# Patient Record
Sex: FEMALE | Race: WHITE | NOT HISPANIC OR LATINO | Employment: FULL TIME | URBAN - METROPOLITAN AREA
[De-identification: names, ages, dates, MRNs, and addresses within clinical notes are randomized per-mention and may not be internally consistent; named-entity substitution may affect disease eponyms.]

---

## 2017-01-13 ENCOUNTER — GENERIC CONVERSION - ENCOUNTER (OUTPATIENT)
Dept: OTHER | Facility: OTHER | Age: 32
End: 2017-01-13

## 2017-01-20 ENCOUNTER — HOSPITAL ENCOUNTER (OUTPATIENT)
Dept: ULTRASOUND IMAGING | Facility: HOSPITAL | Age: 32
Discharge: HOME | End: 2017-01-20
Payer: COMMERCIAL

## 2017-01-20 ENCOUNTER — ALLSCRIPTS OFFICE VISIT (OUTPATIENT)
Dept: OTHER | Facility: OTHER | Age: 32
End: 2017-01-20

## 2017-01-20 DIAGNOSIS — O20.0 THREATENED ABORTION: ICD-10-CM

## 2017-01-20 DIAGNOSIS — Z33.1 PREGNANT STATE, INCIDENTAL: ICD-10-CM

## 2017-01-20 DIAGNOSIS — O03.9 COMPLETE OR UNSPECIFIED SPONTANEOUS ABORTION WITHOUT COMPLICATION: ICD-10-CM

## 2017-01-20 PROCEDURE — 76801 OB US < 14 WKS SINGLE FETUS: CPT

## 2017-01-26 ENCOUNTER — GENERIC CONVERSION - ENCOUNTER (OUTPATIENT)
Dept: OTHER | Facility: OTHER | Age: 32
End: 2017-01-26

## 2017-01-27 ENCOUNTER — ALLSCRIPTS OFFICE VISIT (OUTPATIENT)
Dept: OTHER | Facility: OTHER | Age: 32
End: 2017-01-27

## 2017-02-03 ENCOUNTER — ALLSCRIPTS OFFICE VISIT (OUTPATIENT)
Dept: OTHER | Facility: OTHER | Age: 32
End: 2017-02-03

## 2017-02-06 ENCOUNTER — HOSPITAL ENCOUNTER (OUTPATIENT)
Dept: RADIOLOGY | Age: 32
Discharge: HOME | End: 2017-02-06
Payer: COMMERCIAL

## 2017-02-06 ENCOUNTER — TRANSCRIBE ORDERS (OUTPATIENT)
Dept: ADMINISTRATIVE | Facility: HOSPITAL | Age: 32
End: 2017-02-06

## 2017-02-06 DIAGNOSIS — O02.0 BLIGHTED OVUM: Primary | ICD-10-CM

## 2017-02-06 DIAGNOSIS — O02.0 BLIGHTED OVUM: ICD-10-CM

## 2017-02-06 PROCEDURE — 76801 OB US < 14 WKS SINGLE FETUS: CPT

## 2017-02-07 ENCOUNTER — GENERIC CONVERSION - ENCOUNTER (OUTPATIENT)
Dept: OTHER | Facility: OTHER | Age: 32
End: 2017-02-07

## 2017-02-08 ENCOUNTER — ALLSCRIPTS OFFICE VISIT (OUTPATIENT)
Dept: OTHER | Facility: OTHER | Age: 32
End: 2017-02-08

## 2017-02-09 ENCOUNTER — GENERIC CONVERSION - ENCOUNTER (OUTPATIENT)
Dept: OTHER | Facility: OTHER | Age: 32
End: 2017-02-09

## 2017-02-10 ENCOUNTER — ALLSCRIPTS OFFICE VISIT (OUTPATIENT)
Dept: OTHER | Facility: OTHER | Age: 32
End: 2017-02-10

## 2017-02-10 ENCOUNTER — GENERIC CONVERSION - ENCOUNTER (OUTPATIENT)
Dept: OTHER | Facility: OTHER | Age: 32
End: 2017-02-10

## 2017-02-13 ENCOUNTER — GENERIC CONVERSION - ENCOUNTER (OUTPATIENT)
Dept: OTHER | Facility: OTHER | Age: 32
End: 2017-02-13

## 2017-02-16 ENCOUNTER — ALLSCRIPTS OFFICE VISIT (OUTPATIENT)
Dept: OTHER | Facility: OTHER | Age: 32
End: 2017-02-16

## 2017-02-24 ENCOUNTER — GENERIC CONVERSION - ENCOUNTER (OUTPATIENT)
Dept: OTHER | Facility: OTHER | Age: 32
End: 2017-02-24

## 2017-03-08 ENCOUNTER — GENERIC CONVERSION - ENCOUNTER (OUTPATIENT)
Dept: OTHER | Facility: OTHER | Age: 32
End: 2017-03-08

## 2017-03-22 ENCOUNTER — GENERIC CONVERSION - ENCOUNTER (OUTPATIENT)
Dept: OTHER | Facility: OTHER | Age: 32
End: 2017-03-22

## 2017-03-31 ENCOUNTER — GENERIC CONVERSION - ENCOUNTER (OUTPATIENT)
Dept: OTHER | Facility: OTHER | Age: 32
End: 2017-03-31

## 2017-07-07 ENCOUNTER — ALLSCRIPTS OFFICE VISIT (OUTPATIENT)
Dept: OTHER | Facility: OTHER | Age: 32
End: 2017-07-07

## 2017-07-21 ENCOUNTER — ALLSCRIPTS OFFICE VISIT (OUTPATIENT)
Dept: OTHER | Facility: OTHER | Age: 32
End: 2017-07-21

## 2017-07-21 ENCOUNTER — GENERIC CONVERSION - ENCOUNTER (OUTPATIENT)
Dept: OTHER | Facility: OTHER | Age: 32
End: 2017-07-21

## 2017-07-27 ENCOUNTER — GENERIC CONVERSION - ENCOUNTER (OUTPATIENT)
Dept: OTHER | Facility: OTHER | Age: 32
End: 2017-07-27

## 2017-08-04 ENCOUNTER — TRANSCRIBE ORDERS (OUTPATIENT)
Dept: LAB | Facility: CLINIC | Age: 32
End: 2017-08-04

## 2017-08-04 ENCOUNTER — APPOINTMENT (OUTPATIENT)
Dept: LAB | Facility: CLINIC | Age: 32
End: 2017-08-04
Payer: COMMERCIAL

## 2017-08-04 ENCOUNTER — GENERIC CONVERSION - ENCOUNTER (OUTPATIENT)
Dept: OTHER | Facility: OTHER | Age: 32
End: 2017-08-04

## 2017-08-04 ENCOUNTER — ALLSCRIPTS OFFICE VISIT (OUTPATIENT)
Dept: OTHER | Facility: OTHER | Age: 32
End: 2017-08-04

## 2017-08-04 DIAGNOSIS — Z34.90 ENCOUNTER FOR SUPERVISION OF NORMAL PREGNANCY: ICD-10-CM

## 2017-08-04 DIAGNOSIS — Z34.90 PRENATAL CARE, UNSPECIFIED TRIMESTER: ICD-10-CM

## 2017-08-04 DIAGNOSIS — Z34.90 PRENATAL CARE, UNSPECIFIED TRIMESTER: Primary | ICD-10-CM

## 2017-08-04 LAB
ABO GROUP BLD: NORMAL
BASOPHILS # BLD AUTO: 0.01 THOUSANDS/ΜL (ref 0–0.1)
BASOPHILS NFR BLD AUTO: 0 % (ref 0–1)
BILIRUB UR QL STRIP: NEGATIVE
BLD GP AB SCN SERPL QL: NEGATIVE
CLARITY UR: CLEAR
COLOR UR: YELLOW
EOSINOPHIL # BLD AUTO: 0.12 THOUSAND/ΜL (ref 0–0.61)
EOSINOPHIL NFR BLD AUTO: 2 % (ref 0–6)
ERYTHROCYTE [DISTWIDTH] IN BLOOD BY AUTOMATED COUNT: 14.3 % (ref 11.6–15.1)
GLUCOSE UR STRIP-MCNC: NEGATIVE MG/DL
HCT VFR BLD AUTO: 37.5 % (ref 34.8–46.1)
HGB BLD-MCNC: 12.7 G/DL (ref 11.5–15.4)
HGB UR QL STRIP.AUTO: NEGATIVE
KETONES UR STRIP-MCNC: NEGATIVE MG/DL
LEUKOCYTE ESTERASE UR QL STRIP: NEGATIVE
LYMPHOCYTES # BLD AUTO: 2.86 THOUSANDS/ΜL (ref 0.6–4.47)
LYMPHOCYTES NFR BLD AUTO: 38 % (ref 14–44)
MCH RBC QN AUTO: 28.3 PG (ref 26.8–34.3)
MCHC RBC AUTO-ENTMCNC: 33.9 G/DL (ref 31.4–37.4)
MCV RBC AUTO: 84 FL (ref 82–98)
MONOCYTES # BLD AUTO: 0.5 THOUSAND/ΜL (ref 0.17–1.22)
MONOCYTES NFR BLD AUTO: 7 % (ref 4–12)
NEUTROPHILS # BLD AUTO: 3.95 THOUSANDS/ΜL (ref 1.85–7.62)
NEUTS SEG NFR BLD AUTO: 53 % (ref 43–75)
NITRITE UR QL STRIP: NEGATIVE
PH UR STRIP.AUTO: 5.5 [PH] (ref 4.5–8)
PLATELET # BLD AUTO: 277 THOUSANDS/UL (ref 149–390)
PMV BLD AUTO: 10.4 FL (ref 8.9–12.7)
PROT UR STRIP-MCNC: NEGATIVE MG/DL
RBC # BLD AUTO: 4.48 MILLION/UL (ref 3.81–5.12)
RH BLD: POSITIVE
RUBV IGG SERPL IA-ACNC: 107.4 IU/ML
SP GR UR STRIP.AUTO: 1.02 (ref 1–1.03)
SPECIMEN EXPIRATION DATE: NORMAL
UROBILINOGEN UR QL STRIP.AUTO: 0.2 E.U./DL
WBC # BLD AUTO: 7.44 THOUSAND/UL (ref 4.31–10.16)

## 2017-08-04 PROCEDURE — 80081 OBSTETRIC PANEL INC HIV TSTG: CPT

## 2017-08-04 PROCEDURE — 36415 COLL VENOUS BLD VENIPUNCTURE: CPT

## 2017-08-04 PROCEDURE — 87086 URINE CULTURE/COLONY COUNT: CPT

## 2017-08-04 PROCEDURE — 81003 URINALYSIS AUTO W/O SCOPE: CPT | Performed by: OBSTETRICS & GYNECOLOGY

## 2017-08-05 LAB
BACTERIA UR CULT: NORMAL
HBV SURFACE AG SER QL: NORMAL
HIV 1+2 AB+HIV1 P24 AG SERPL QL IA: NORMAL
RPR SER QL: NORMAL

## 2017-08-08 ENCOUNTER — GENERIC CONVERSION - ENCOUNTER (OUTPATIENT)
Dept: OTHER | Facility: OTHER | Age: 32
End: 2017-08-08

## 2017-08-08 ENCOUNTER — LAB REQUISITION (OUTPATIENT)
Dept: LAB | Facility: HOSPITAL | Age: 32
End: 2017-08-08
Payer: COMMERCIAL

## 2017-08-08 DIAGNOSIS — Z34.90 ENCOUNTER FOR SUPERVISION OF NORMAL PREGNANCY: ICD-10-CM

## 2017-08-08 PROCEDURE — 87491 CHLMYD TRACH DNA AMP PROBE: CPT | Performed by: OBSTETRICS & GYNECOLOGY

## 2017-08-08 PROCEDURE — 87591 N.GONORRHOEAE DNA AMP PROB: CPT | Performed by: OBSTETRICS & GYNECOLOGY

## 2017-08-09 LAB
CHLAMYDIA DNA CVX QL NAA+PROBE: NORMAL
N GONORRHOEA DNA GENITAL QL NAA+PROBE: NORMAL

## 2017-09-12 ENCOUNTER — GENERIC CONVERSION - ENCOUNTER (OUTPATIENT)
Dept: OTHER | Facility: OTHER | Age: 32
End: 2017-09-12

## 2017-09-12 ENCOUNTER — ALLSCRIPTS OFFICE VISIT (OUTPATIENT)
Dept: OTHER | Facility: OTHER | Age: 32
End: 2017-09-12

## 2017-10-04 ENCOUNTER — GENERIC CONVERSION - ENCOUNTER (OUTPATIENT)
Dept: OTHER | Facility: OTHER | Age: 32
End: 2017-10-04

## 2017-10-04 ENCOUNTER — ALLSCRIPTS OFFICE VISIT (OUTPATIENT)
Dept: PERINATAL CARE | Facility: CLINIC | Age: 32
End: 2017-10-04
Payer: COMMERCIAL

## 2017-10-04 PROCEDURE — 76817 TRANSVAGINAL US OBSTETRIC: CPT | Performed by: OBSTETRICS & GYNECOLOGY

## 2017-10-04 PROCEDURE — 76811 OB US DETAILED SNGL FETUS: CPT | Performed by: OBSTETRICS & GYNECOLOGY

## 2017-10-13 ENCOUNTER — GENERIC CONVERSION - ENCOUNTER (OUTPATIENT)
Dept: OTHER | Facility: OTHER | Age: 32
End: 2017-10-13

## 2017-11-13 ENCOUNTER — GENERIC CONVERSION - ENCOUNTER (OUTPATIENT)
Dept: OTHER | Facility: OTHER | Age: 32
End: 2017-11-13

## 2017-11-20 ENCOUNTER — APPOINTMENT (OUTPATIENT)
Dept: LAB | Facility: CLINIC | Age: 32
End: 2017-11-20
Payer: COMMERCIAL

## 2017-11-20 ENCOUNTER — TRANSCRIBE ORDERS (OUTPATIENT)
Dept: LAB | Facility: CLINIC | Age: 32
End: 2017-11-20

## 2017-11-20 DIAGNOSIS — Z3A.26 26 WEEKS GESTATION OF PREGNANCY: ICD-10-CM

## 2017-11-20 DIAGNOSIS — Z3A.26 26 WEEKS GESTATION OF PREGNANCY: Primary | ICD-10-CM

## 2017-11-20 LAB
ERYTHROCYTE [DISTWIDTH] IN BLOOD BY AUTOMATED COUNT: 12.7 % (ref 11.6–15.1)
GLUCOSE 1H P 50 G GLC PO SERPL-MCNC: 94 MG/DL
HCT VFR BLD AUTO: 36.8 % (ref 34.8–46.1)
HGB BLD-MCNC: 12.3 G/DL (ref 11.5–15.4)
MCH RBC QN AUTO: 29.9 PG (ref 26.8–34.3)
MCHC RBC AUTO-ENTMCNC: 33.4 G/DL (ref 31.4–37.4)
MCV RBC AUTO: 89 FL (ref 82–98)
PLATELET # BLD AUTO: 259 THOUSANDS/UL (ref 149–390)
PMV BLD AUTO: 10.5 FL (ref 8.9–12.7)
RBC # BLD AUTO: 4.12 MILLION/UL (ref 3.81–5.12)
WBC # BLD AUTO: 10.14 THOUSAND/UL (ref 4.31–10.16)

## 2017-11-20 PROCEDURE — 86592 SYPHILIS TEST NON-TREP QUAL: CPT

## 2017-11-20 PROCEDURE — 36415 COLL VENOUS BLD VENIPUNCTURE: CPT

## 2017-11-20 PROCEDURE — 85027 COMPLETE CBC AUTOMATED: CPT

## 2017-11-20 PROCEDURE — 82950 GLUCOSE TEST: CPT

## 2017-11-21 LAB — RPR SER QL: NORMAL

## 2017-12-15 ENCOUNTER — GENERIC CONVERSION - ENCOUNTER (OUTPATIENT)
Dept: OTHER | Facility: OTHER | Age: 32
End: 2017-12-15

## 2017-12-28 ENCOUNTER — GENERIC CONVERSION - ENCOUNTER (OUTPATIENT)
Dept: OTHER | Facility: OTHER | Age: 32
End: 2017-12-28

## 2017-12-29 ENCOUNTER — GENERIC CONVERSION - ENCOUNTER (OUTPATIENT)
Dept: OTHER | Facility: OTHER | Age: 32
End: 2017-12-29

## 2017-12-29 ENCOUNTER — APPOINTMENT (OUTPATIENT)
Dept: PERINATAL CARE | Facility: CLINIC | Age: 32
End: 2017-12-29
Payer: COMMERCIAL

## 2017-12-29 PROCEDURE — 76816 OB US FOLLOW-UP PER FETUS: CPT | Performed by: OBSTETRICS & GYNECOLOGY

## 2018-01-10 ENCOUNTER — GENERIC CONVERSION - ENCOUNTER (OUTPATIENT)
Dept: OTHER | Facility: OTHER | Age: 33
End: 2018-01-10

## 2018-01-10 NOTE — MISCELLANEOUS
Message   Recorded as Task   Date: 2017 01:10 PM, Created By: Karthik Reynolds   Task Name: Medical Complaint Callback   Assigned To: Marcia Rod   Regarding Patient: Damaris Bhatt, Status: In Progress   Comment:    Traci Angulo  1:10 PM     TASK CREATED  Caller: Self; Medical Complaint; [phone] (Home)  Patient started Cytotec yesterday and was seen by Dr Marcella Mantilla earlier yesterday  She doesn't think it fully worked and would like to know her next step  Sherre Soulier Sherre Soulier Would like to speak with Dr Marcella Mantilla  [phone]   Traci Angulo - 2017 3:40 PM     TASK REASSIGNED: Previously Assigned To [id number] [place] [address] - 2017 3:41 PM     TASK EDITED  please call asap   I assigned to incorrect task bin   [facility] - 2017 3:46 PM     TASK IN PROGRESS   Lupe Moore  3:51 PM     TASK REPLIED TO: Previously Assigned To KEARA OB,Team     spoke w/ pt   pt stated after cytotec was placed yesterday at home she did pass a few clots and 3 of the 4 cytotec tabs  Pt is unsure if she needs another dose  Will contact Dr Marcella Mantilla and advise pt  Lupe Moore  4:10 PM     TASK REPLIED TO: Previously Assigned To KEARA [facility],Team      unable to contact dr ferrell wishes to be seen tomorrow  apt for [facility] @ 1240p  Lupe Moore - 10 Feb 2017 9:40 AM     TASK REPLIED TO: Previously Assigned To KEARA OB,Team    spoke w/ pt this am  Pt cx apt for today due to work obligations  Pt still has questions   I let pt know that I will pass this information on to CG  I did let pt know CG has a very busy sched  Rashaad Mackay - 2017 11:13 AM     TASK REPLIED TO: Previously Assigned To KEARA [facility],Team  Pt was seen by CG on 2/10 and cytotec was placed  Pt has f/u in our office scheduled  Active Problems    1  , spontaneous (634 90) (O03 9)   2  Currently pregnant (V22 2) (Z33 1)   3  History of recurrent miscarriages (N96)   4   Migraine (346 90) (G43 909)   5  Pelvic pain (R10 2)   6  Pregnancy (V22 2) (Z33 1)   7  Pregnancy with threatened  (640 00) (O20 0)    Current Meds   1  Oxycodone-Acetaminophen 5-325 MG Oral Tablet (Percocet); TAKE 1 TABLET EVERY 4   HOURS AS NEEDED FOR PAIN;   Therapy: 09EMF4990 to (Evaluate:92Nmv8904); Last Rx:28Pah4476 Ordered   2  Prenate DHA 28-0 6-0 4-300 MG Oral Capsule Recorded    Allergies    1  No Known Drug Allergies    2  Animal dander   3  Animal dander - Cats   4  Animal dander - Dogs   5  Dust   6  Grass   7  Mold   8  No Known Food Allergies   9  Pollen   10  Ragweed   11   Trees    Signatures   Electronically signed by : Orion Mcclendon, ; 2017 11:14AM EST                       (Author)

## 2018-01-10 NOTE — MISCELLANEOUS
Message   Recorded as Task   Date: 01/11/2017 03:16 PM, Created By: AlexeiNineSixFiveas   Task Name: Care Coordination   Assigned To: Les Nose   Regarding Patient: Jomar Sibley, Status: In Progress   DbNikko Black - 11 Jan 2017 3:16 PM     TASK CREATED  Caller: Self; Care Coordination; [phone] (Home); [phone] (Work)  Patient had a miscarriage 11/12/16, 3 weeks of bleeding,  now has a +HPT  I scheduled her routine OB appointments  She mentioned that you said she could have an early US with her next pregnancy  I did not see that in your note  Please advise if you would like that scheduled  Carrington La - 12 Jan 2017 3:28 PM     TASK REPLIED TO: Previously Assigned To KEARA [facility],Team           ok for early Cholo Urmila - 12 Jan 2017 3:37 PM     TASK REASSIGNED: Previously Assigned To Kendrick Kuhn - 13 Jan 2017 11:35 AM     TASK EDITED  l/m on voice mail   Jamarcus Chiang - 13 Jan 2017 11:35 AM     TASK IN PROGRESS   [facility] - 13 Jan 2017 3:22 PM     TASK REPLIED TO: Previously Assigned To [facility] with pt today  She has an u/s schedule for 1/26 in [place] with [facility] - 13 Jan 2017 3:47 PM     TASK REPLIED TO: Previously Assigned To KEARA OB,Team  Pt needs refill - was originally prescribed at [facility]  Will leave samples at , ok per [facility] - 13 Jan 2017 3:49 PM     TASK REPLIED TO: Previously Assigned To Wai last message  entered on wrong pt  Active Problems    1  Migraine (346 90) (J41 789)    Current Meds   1  Prenate DHA 28-0 6-0 4-300 MG Oral Capsule Recorded    Allergies    1  No Known Drug Allergies    2  Animal dander   3  Animal dander - Cats   4  Animal dander - Dogs   5  Dust   6  Grass   7  Mold   8  No Known Food Allergies   9  Pollen   10  Ragweed   11   Trees    Signatures   Electronically signed by : Bhargav Arana, ; Jan 13 2017  3:49PM EST (Author)

## 2018-01-11 NOTE — MISCELLANEOUS
Message   Recorded as Task   Date: 11/03/2016 04:54 PM, Created By: Paloma Yan   Task Name: Care Coordination   Assigned To: Julian Haque   Regarding Patient: Sami Godwin, Status: In Progress   Danielle Moyer - 80 Nov 2016 4:54 PM     TASK CREATED  Just completed OB interview for this pt you will see for PN1 11/22  She is ~8wks pregnant and does have plans to travel to [address] for a conference with her work at the end of November  Given the concern for Zika in [place] would we recommend pt avoid travel to this region? Also, pt uses Maxalt for migraines outside of pregnancy - I told her it is a catagory C and she has not used it thus far  Would we recommend d/c'd use throughout pregnancy? Please advise  Thank you!!   Alysa More - 04 Nov 2016 10:04 AM     TASK REPLIED TO: Previously Assigned To Yasemin Marcelo is about 45 mins away from [facility] - so not specifically in the [facility] area, but not too far away - would encouraged her if she does go that she needs to be extremely cautious with bug spray, but it would not be unreasonable to avoid travel to there  Maxalt - I would recommend d/c  during pregnancy  Paloma Yan - 04 Nov 2016 2:15 PM     TASK REASSIGNED: Previously Assigned To Len Portillo - 29 Nov 2016 2:29 PM     TASK IN PROGRESS   Paloma Yan - 04 Nov 2016 2:30 PM     TASK EDITED  [facility] for pt to c/b   Paloma Yan - 04 Nov 2016 3:30 PM     TASK EDITED  Spoke with pt and did discuss travel plans and safe medications  Active Problems    1  Migraine (346 90) (G43 909)   2  Pregnancy, obstetrical care (V22 1) (Z34 90)   3  Pregnancy, obstetrical care, first trimester (V22 1) (Z34 91)    Current Meds   1  Ortho Tri-Cyclen Lo 0 18/0 215/0 25 MG-25 MCG Oral Tablet (Norgestim-Eth Estrad   Triphasic); TAKE 1 TABLET BY MOUTH ONCE A DAY;    Therapy: 13VME5289 to (Evaluate:40Vhv4578)  Requested for: 79SLM9438; Last GZ:54EQM0878 Ordered   2  Joce Lo 0 18/0 215/0 25 MG-25 MCG Oral Tablet; TAKE 1 TABLET BY MOUTH ONCE   A DAY; Therapy: 71VSU9599 to (Evaluate:29Mhz4451)  Requested for: 27Apr2016; Last   Rx:27Apr2016 Ordered    Allergies    1  No Known Drug Allergies    2  Animal dander   3  Animal dander - Cats   4  Animal dander - Dogs   5  Dust   6  Grass   7  Mold   8  No Known Food Allergies   9  Pollen   10  Ragweed   11   Trees    Signatures   Electronically signed by : Merrick Rock, ; Nov 4 2016  3:30PM EST                       (Author)

## 2018-01-11 NOTE — MISCELLANEOUS
Message   Recorded as Task   Date: 11/09/2016 09:59 AM, Created By: Callie Williamson   Task Name: Medical Complaint Callback   Assigned To: Pj Sweeney   Regarding Patient: Nazario Tejeda, Status: In Progress   Glen Zamudio - 09 Nov 2016 9:59 AM     TASK CREATED  Caller: Self; Medical Complaint; [phone] (Home); [phone] (Work)  Patient is 9 weeks, yesterday she had light pink only when wiping  Today she has some dark brown when wiping  She has no pain, no cramping, nothing on her pantyliner  I told her to [facility] and a nurse will call her back  Renetta Carlson - 09 Nov 2016 11:33 AM     TASK IN PROGRESS   Renetta Aldo - 09 Nov 2016 11:41 AM     TASK REPLIED TO: Previously Assigned To [address] with pt  She reports some slight light pink spotting off and on since yesterday afternoon  Pt denies any accumulation of blood on the pad - she just notices some light pink/light brown on TP when wiping  Pt denies intercourse in the past 48hrs, denies change in BM, or UTI sx  Pt denies any abdominal pain or cramping  Pt was offered apt today vs  monitoring at home  Pt chose to monitor for the time being  Pt will call back with increase/worsening of sx  Pt to be on pelvic rest and hydrate well today  Active Problems    1  Migraine (346 90) (G43 909)   2  Pregnancy, obstetrical care (V22 1) (Z34 90)   3  Pregnancy, obstetrical care, first trimester (V22 1) (Z34 91)    Current Meds   1  Ortho Tri-Cyclen Lo 0 18/0 215/0 25 MG-25 MCG Oral Tablet (Norgestim-Eth Estrad   Triphasic); TAKE 1 TABLET BY MOUTH ONCE A DAY; Therapy: 88JFY3419 to (Evaluate:34Yuo1304)  Requested for: 45JKM6671; Last   Rx:07Jan2016 Ordered   2  TriNessa Lo 0 18/0 215/0 25 MG-25 MCG Oral Tablet; TAKE 1 TABLET BY MOUTH ONCE   A DAY; Therapy: 72GZO6812 to (Evaluate:88Sbx7145)  Requested for: 27Apr2016; Last   Rx:27Apr2016 Ordered    Allergies    1  No Known Drug Allergies    2  Animal dander   3   Animal dander - Cats   4  Animal dander - Dogs   5  Dust   6  Grass   7  Mold   8  No Known Food Allergies   9  Pollen   10  Ragweed   11   Trees    Signatures   Electronically signed by : Syeda Jack, ; Nov 9 2016 11:42AM EST                       (Author)

## 2018-01-11 NOTE — MISCELLANEOUS
Message   Recorded as Task   Date: 2017 12:42 PM, Created By: Keyla Pop   Task Name: Review Document   Assigned To: Sai Momin   Regarding Patient: Alice Bob, Status: In Progress   Comment:    Trenton Carson - 31 Mar 2017 12:42 PM     TASK CREATED  Please notify patient her Eulis Leather is 14, please repeat next week  She should be done soon    - 31 Mar 2017 12:47 PM     TASK IN PROGRESS   [place] - 31 Mar 2017 12:47 PM     TASK REPLIED TO: Previously Assigned To Lisa for pt to c/b   Becca Kolb - 31 Mar 2017 12:55 PM     TASK REPLIED TO: Previously Assigned To KEARA JEFF,Team  s/w patient, she is aware of results and will repeat in one week  Active Problems    1  , spontaneous (634 90) (O03 9)   2  Currently pregnant (V22 2) (Z33 1)   3  History of recurrent miscarriages (N96)   4  Migraine (346 90) (G43 909)   5  Pelvic pain (R10 2)   6  Pregnancy (V22 2) (Z33 1)   7  Pregnancy with threatened  (640 00) (O20 0)    Current Meds   1  Oxycodone-Acetaminophen 5-325 MG Oral Tablet (Percocet); TAKE 1 TABLET EVERY 4   HOURS AS NEEDED FOR PAIN;   Therapy: 68TLN6387 to (Evaluate:32Iol1783); Last Rx:62Sbh8946 Ordered   2  Prenate DHA 28-0 6-0 4-300 MG Oral Capsule Recorded    Allergies    1  No Known Drug Allergies    2  Animal dander   3  Animal dander - Cats   4  Animal dander - Dogs   5  Dust   6  Grass   7  Mold   8  No Known Food Allergies   9  Pollen   10  Ragweed   11   Trees    Signatures   Electronically signed by : Alicia Delacruz, ; Mar 31 2017 12:55PM EST                       (Author)

## 2018-01-11 NOTE — MISCELLANEOUS
Message   Recorded as Task   Date: 2016 04:12 PM, Created By: Melissa Yin   Task Name: View Note   Assigned To: Melita Nogueira   Regarding Patient: Daniel Alexander, Status: In Progress   CommentLindarima Trujillotomas - 01 Dec 2016 4:12 PM     TASK CREATED  tell patient recheck HCG in one week   TeresaLupe - 01 Dec 2016 4:38 PM     TASK REPLIED TO: Previously Assigned To Melita Nogueira  spoke w/ pt aware  Pt requested rx fax to her fax  [phone]  TeresaLupe - 01 Dec 2016 4:38 PM     TASK IN PROGRESS        Active Problems    1  , spontaneous complete (634 92) (O03 9)   2  Migraine (346 90) (G43 909)    Current Meds   1  Prenate DHA 28-0 6-0 4-300 MG Oral Capsule Recorded    Allergies    1  No Known Drug Allergies    2  Animal dander   3  Animal dander - Cats   4  Animal dander - Dogs   5  Dust   6  Grass   7  Mold   8  No Known Food Allergies   9  Pollen   10  Ragweed   11  Trees    Plan  , spontaneous complete    · (1) HCG QUANT; Status:Active - Retrospective Authorization;  Requested for:55Qkt9212;     Signatures   Electronically signed by : Corwin Castro, ; Dec  1 2016  4:39PM EST                       (Author)

## 2018-01-12 NOTE — MISCELLANEOUS
Message  I tried calling patient today on the phone to relay her ultrasound results  There was no answer  I did not discuss her ultrasound results        Signatures   Electronically signed by : OXANA Thomas ; Feb 7 2017  3:44PM EST                       (Author)

## 2018-01-13 VITALS
DIASTOLIC BLOOD PRESSURE: 68 MMHG | WEIGHT: 174 LBS | BODY MASS INDEX: 28.99 KG/M2 | SYSTOLIC BLOOD PRESSURE: 114 MMHG | HEIGHT: 65 IN

## 2018-01-13 VITALS
DIASTOLIC BLOOD PRESSURE: 70 MMHG | HEIGHT: 65 IN | SYSTOLIC BLOOD PRESSURE: 114 MMHG | BODY MASS INDEX: 29.32 KG/M2 | WEIGHT: 176 LBS

## 2018-01-13 VITALS
HEIGHT: 65 IN | DIASTOLIC BLOOD PRESSURE: 70 MMHG | WEIGHT: 174 LBS | BODY MASS INDEX: 28.99 KG/M2 | SYSTOLIC BLOOD PRESSURE: 114 MMHG

## 2018-01-13 VITALS
SYSTOLIC BLOOD PRESSURE: 102 MMHG | BODY MASS INDEX: 29.66 KG/M2 | DIASTOLIC BLOOD PRESSURE: 64 MMHG | HEIGHT: 65 IN | WEIGHT: 178 LBS

## 2018-01-13 NOTE — PROGRESS NOTES
OCT 4 2017         RE: Andrey Poe                               To: [facility]  MR#: [id number]                                    [address]   : [address]   ENC: [id number]                             [place], [address]   (Exam #: [id number])                           Fax: [phone]      The LMP of this 27year old,  G4, P1-0-2-1 patient was MAY 17 2017, giving   her an CAT of 2018 and a current gestational age of 25 weeks 0 days   by dates  A sonographic examination was performed on OCT 4 2017 using real   time equipment  The ultrasound examination was performed using abdominal &   vaginal techniques  The patient has a BMI of 31 8  Her blood pressure   today was 122/66  Earliest US on record:17 7w3d 18 CAT      Cardiac motion was observed at 135 bpm       INDICATIONS      fetal anatomical survey   obesity      Exam Types      LEVEL II   Transvaginal      RESULTS      Fetus # 1 of 1   Vertex presentation   Fetal growth appeared normal   Placenta Location = Posterior   No placenta previa   Placenta Grade = I      MEASUREMENTS (* Included In Average GA)      AC              16 2 cm        21 weeks 0 days* (70%)   BPD              4 8 cm        20 weeks 4 days* (64%)   HC              17 6 cm        20 weeks 0 days* (48%)   Femur            3 5 cm        21 weeks 1 day * (63%)      Nuchal Fold      4 2 mm   NBL              5 6 mm      Humerus          3 4 cm        21 weeks 3 days  (83%)      Cerebellum       2 1 cm        20 weeks 3 days   Biorbit          3 3 cm        21 weeks 0 days   CisternaMagna    6 2 mm      HC/AC           1 09   FL/AC           0 21   FL/BPD          0 73   EFW (Ac/Fl/Hc)   389 grams - 0 lbs 14 oz      THE AVERAGE GESTATIONAL AGE is 20 weeks 5 days +/- 10 days        AMNIOTIC FLUID         Largest Vertical Pocket = 4 4 cm   Amniotic Fluid: Normal      CERVICAL EVALUATION The cervix appeared normal (Ultrasound Examination)  SUPINE      Cervical Length: 4 50 cm      OTHER TEST RESULTS           Funneling?: No             Dynamic Changes?: No        Resp  To TFP?: No                      Debris?: No      ANATOMY      Head                                    Normal   Face/Neck                               Normal   Th  Cav  Normal   Heart                                   Normal   Abd  Cav  Normal   Stomach                                 Normal   Right Kidney                            Normal   Left Kidney                             Normal   Bladder                                 Normal   Abd  Wall                               Normal   Spine                                   Normal   Extrems                                 Normal   Genitalia                               Normal   Placenta                                Normal   Umbl  Cord                              Normal   Uterus                                  Abnormal   PCI                                     Normal      ANATOMY DETAILS      Visualized Appearing Sonographically Normal:   HEAD: (Calvarium, BPD Level, Cavum, Lateral Ventricles, Choroid Plexus,   Cerebellum, Cisterna Magna);    FACE/NECK: (Neck, Nuchal Fold, Profile,   Orbits, Nose/Lips, Palate, Face);    TH  CAV  : (Lungs, Diaphragm); HEART: (Four Chamber View, Proximal Left Outflow, Proximal Right Outflow,   3VV, 3 Vessel Trachea, Short Axis of Greater Vessels, Ductal Arch, Aortic   Arch, Interventricular Septum, Interatrial Septum, IVC, SVC, Cardiac Axis,   Cardiac Position);    ABD  CAV : (Liver);    STOMACH, RIGHT KIDNEY, LEFT   KIDNEY, BLADDER, ABD   WALL, SPINE: (Cervical Spine, Thoracic Spine, Lumbar   Spine, Sacrum);    EXTREMS: (Lt Humerus, Rt Humerus, Lt Forearm, Rt   Forearm, Lt Hand, Rt Hand, Lt Femur, Rt Femur, Lt Low Leg, Rt Low Leg, Lt   Foot, Rt Foot);    GENITALIA (Female), PLACENTA, UMBL  CORD, PCI      Abnormal:   UTERUS      FIBROIDS      1  Subserosal myometrium fibroid located in the right lateral fundus   region, measuring 2 2 x 1 9 x 2 4cm with a volume of 5 2cc  Color doppler   flow was not increased  The appearance was heterogeneous  2  Intramural myometrium fibroid located in the middle anterior corpus   region, measuring 1 2 x 1 4 x 0 7cm with a volume of 0 6cc  Color doppler   flow was not increased  The appearance was heterogeneous  ADNEXA      The left ovary appeared normal and measured 1 8 x 1 9 x 1 9 cm with a   volume of 3 4 cc  The right ovary appeared normal and measured 2 5 x 2 1 x   1 7 cm with a volume of 4 7 cc  IMPRESSION      Evans IUP   20 weeks and 5 days by this ultrasound  (CAT=2018)   Vertex presentation   Fetal growth appeared normal   Regular fetal heart rate of 135 bpm   Posterior placenta   No placenta previa      CONSULT COMMENT      Thank you very much for your kind referral of Leonardo Leonardo to the   [address] [place] [address] in [facility] on 2017 for level II ultrasound   evaluation and MFM consult  Radha Bell is a 64-year-old  4 para 46   white female who is currently at 20-0/7 weeks gestation by an estimated   due date of 2018 which is based upon menstrual dating  She   presents for fetal anatomic evaluation, with consultation performed for   the indication of obesity  Her prenatal course so far has been   unremarkable  Carola has no complaints  She reports fetal movement and   denies vaginal bleeding  She recently received the influenza vaccine  She   declined genetic screening earlier in the pregnancy  Radha Bell has not yet   been screened for gestational diabetes during this pregnancy  Minerva Fuentes has a history of a prior vaginal delivery at term in  following   an uncomplicated prenatal course  She delivered a 7 lbs  0 oz  baby boy,   currently healthy   She also has a history of 2 first trimester spontaneous   pregnancy losses  She is obese, with a current BMI of 31 8  Her past   medical history is otherwise significant for migraine headaches, treated   with Tylenol and Fioricet, and exercise induced asthma  Her past surgical   history is significant for nasal surgery, a benign breast biopsy, and   wisdom teeth removal   She takes no medication with the exception of a   prenatal vitamin on a daily basis and has no known drug allergy  She   denies tobacco, alcohol, or illicit drug use during the pregnancy  The   family genetic history is negative with respect to genetic abnormalities,   birth defects, or mental retardation  The family medical history is   negative with respect to first degree relatives with diabetes,   hypertension, or venous thromboembolism  No fetal structural abnormality or ultrasound marker for aneuploidy is   identified on the Level II ultrasound study today  Fetal growth and   amniotic fluid volume are normal   The placenta is normal in appearance  Two small uterine myomas are identified  The cervix is normal in appearance by transvaginal sonography  The   cervical length is normal   Cervical debris is not present  Cervical   funneling is not present  Neither provocative nor dynamic change is   appreciated  Today's ultrasound findings and suggested follow-up were discussed in   detail with Carola  We discussed that prenatal ultrasound cannot rule out   all congenital abnormalities  We discussed that uterine myomas are   associated with an increased risk for adverse pregnancy outcomes including   the development of abdominal pain,  labor, fetal growth   restriction, vaginal bleeding, and  section, among others  However, the majority of pregnancies in which myomas are present are   associated with favorable outcomes   Maternal obesity is associated with an   increased risk for adverse pregnancy outcomes, including gestational   diabetes, fetal growth abnormalities including macrosomia, fetal   structural abnormalities, preeclampsia, venous thromboembolism,   stillbirth, and increased likelihood for  section  Screening for   gestational diabetes should be considered soon  Patria Botello will return to   the [facility]  at 32 weeks gestation to assess fetal interval growth  The face to face time, in addition to time spent discussing ultrasound   results, was approximately 15 minutes, greater than 50% of which was spent   during counseling and coordination of care  ZENAIDA Stokes , R GIORGIO C S  OXANA Salcedo     Maternal-Fetal Medicine   Electronically signed 10/08/17 16:24

## 2018-01-13 NOTE — MISCELLANEOUS
Message   Recorded as Task   Date: 2017 07:08 AM, Created By: Rosio Del Rio   Task Name: Review Document   Assigned To: Merly Diaz   Regarding Patient: Brian Pepe, Status: In Progress   Comment:    WichoglennShaneannmarielindsey - 22 Mar 2017 7:08 AM     TASK CREATED  Please notify patient her quant was 46, please repeat weekly till less than 5   Armando Picking - 22 Mar 2017 8:27 AM     TASK IN PROGRESS   Armando Picking - 22 Mar 2017 8:27 AM     TASK REPLIED TO: Previously Assigned To KEARA OB,Team  LMOM for pt to c/b   Becca Kolb - 22 Mar 2017 9:54 AM     TASK REPLIED TO: Previously Assigned To KEARA OB,Team     s/w patient, she will go for repeat quant next week, she has an order  Active Problems    1  , spontaneous (634 90) (O03 9)   2  Currently pregnant (V22 2) (Z33 1)   3  History of recurrent miscarriages (N96)   4  Migraine (346 90) (G43 909)   5  Pelvic pain (R10 2)   6  Pregnancy (V22 2) (Z33 1)   7  Pregnancy with threatened  (640 00) (O20 0)    Current Meds   1  Oxycodone-Acetaminophen 5-325 MG Oral Tablet (Percocet); TAKE 1 TABLET EVERY 4   HOURS AS NEEDED FOR PAIN;   Therapy: 30LHH6047 to (Evaluate:90Ulw3564); Last Rx:22Fsd1670 Ordered   2  Prenate DHA 28-0 6-0 4-300 MG Oral Capsule Recorded    Allergies    1  No Known Drug Allergies    2  Animal dander   3  Animal dander - Cats   4  Animal dander - Dogs   5  Dust   6  Grass   7  Mold   8  No Known Food Allergies   9  Pollen   10  Ragweed   11   Trees    Signatures   Electronically signed by : Melia Yarbrough, ; Mar 22 2017  9:55AM EST                       (Author)

## 2018-01-14 VITALS
SYSTOLIC BLOOD PRESSURE: 118 MMHG | BODY MASS INDEX: 29.16 KG/M2 | WEIGHT: 175 LBS | DIASTOLIC BLOOD PRESSURE: 70 MMHG | HEIGHT: 65 IN

## 2018-01-14 VITALS
HEIGHT: 65 IN | SYSTOLIC BLOOD PRESSURE: 106 MMHG | WEIGHT: 178 LBS | BODY MASS INDEX: 29.66 KG/M2 | DIASTOLIC BLOOD PRESSURE: 70 MMHG

## 2018-01-14 VITALS
DIASTOLIC BLOOD PRESSURE: 66 MMHG | SYSTOLIC BLOOD PRESSURE: 116 MMHG | HEIGHT: 65 IN | BODY MASS INDEX: 29.66 KG/M2 | WEIGHT: 178 LBS

## 2018-01-14 NOTE — MISCELLANEOUS
Message   Recorded as Task   Date: 2017 02:47 PM, Created By: Angelica Cárdenas   Task Name: Call Back   Assigned To: KEARA OB,Team   Regarding Patient: Paris Stone, Status: In Progress   Comment:    DanayfrancaArianne - 2017 2:47 PM     TASK CREATED  Pt called office today, left voicemail message  Pt states she is approximately ten weeks pregnant and has been suffering from migraine headaches  Pt states she takes Tylenol over the counter, however, feels Tylenol is not helping her  Pt requests that nursing staff return her call at [phone]  Thank you! Neida, 2:49 PM     TASK IN PROGRESS   Neida 3:02 PM     TASK EDITED  called pt- states has H/A's unresolved with Tylenol  per LS, pt can try drinking caffeine(soda) with Tylenol  pt informed  if no relief, pt to call office  Active Problems    1  , spontaneous (634 90) (O03 9)   2  Currently pregnant (V22 2) (Z34 90)   3  History of recurrent miscarriages (N96)   4  Migraine (346 90) (G43 909)   5  Pelvic pain (R10 2)   6  Pregnancy (V22 2) (Z34 90)   7  Pregnancy with threatened  (640 00) (O20 0)    Current Meds   1  Oxycodone-Acetaminophen 5-325 MG Oral Tablet (Percocet); TAKE 1 TABLET EVERY 4   HOURS AS NEEDED FOR PAIN;   Therapy: 60ZLY5288 to (Evaluate:34Hug6132); Last Rx:77Yne2413 Ordered   2  Prenate DHA 28-0 6-0 4-300 MG Oral Capsule Recorded    Allergies    1  No Known Drug Allergies    2  Animal dander   3  Animal dander - Cats   4  Animal dander - Dogs   5  Dust   6  Grass   7  Mold   8  No Known Food Allergies   9  Pollen   10  Ragweed   11   Trees    Signatures   Electronically signed by : Cooper Jones RN; 2017  3:03PM EST                       (Author)

## 2018-01-14 NOTE — MISCELLANEOUS
Message   Recorded as Task   Date: 2016 04:12 PM, Created By: Vianney Deal   Task Name: View Note   Assigned To: Rell Dolan   Regarding Patient: Ronny Nyhan, Status: In Progress   Samuel Fess - 01 Dec 2016 4:12 PM     TASK CREATED  tell patient recheck HCG in one week   TeresaLupe - 01 Dec 2016 4:38 PM     TASK REPLIED TO: Previously Assigned To Rell Dolan  spoke w/ pt aware  Pt requested rx fax to her fax  [phone]  TeresaLupe - 01 Dec 2016 4:38 PM     TASK IN PROGRESS   Inell Seton - 07 Dec 2016 2:49 PM     TASK REPLIED TO: Previously Assigned To [address] with pt  She is feeling well  Bleeding is minimal  She has f/u apt tomorrow  Pt states she is doing ok emotionally  She has good support at home  Pt plans to try for pregnancy after 1st nl period  Active Problems    1  , spontaneous complete (634 92) (O03 9)   2  Migraine (346 90) (G43 909)    Current Meds   1  Prenate DHA 28-0 6-0 4-300 MG Oral Capsule Recorded    Allergies    1  No Known Drug Allergies    2  Animal dander   3  Animal dander - Cats   4  Animal dander - Dogs   5  Dust   6  Grass   7  Mold   8  No Known Food Allergies   9  Pollen   10  Ragweed   11   Trees    Signatures   Electronically signed by : Armando Arias, ; Dec  7 2016  2:54PM EST                       (Author)

## 2018-01-15 VITALS
WEIGHT: 191 LBS | HEIGHT: 65 IN | DIASTOLIC BLOOD PRESSURE: 66 MMHG | BODY MASS INDEX: 31.82 KG/M2 | SYSTOLIC BLOOD PRESSURE: 122 MMHG

## 2018-01-15 NOTE — CONSULTS
I had the pleasure of evaluating your patient, Brian Gu  My full evaluation follows:      Chief Complaint  Here for ultrasound study      History of Present Illness  Please refer to the ultrasound report for additional information      Active Problems    1  Currently pregnant (V22 2) (Z34 90)   2  Encounter for supervision of normal pregnancy (V22 1) (Z34 90)   3  History of recurrent miscarriages (N96)   4  Migraine (346 90) (G43 909)   5  Pregnancy (V22 2) (Z34 90)    Past Medical History    · History of , spontaneous complete (634 92) (O03 9)   · History of headache (V13 89) (G75 868)   · History of migraine (V12 49) (Z86 69)   · History of spontaneous  (V13 29) (Z87 59)   · Normal delivery (650) (O80,Z37  9)    Surgical History    · History of Nasal Septal Deviation Repair   · History of Oral Surgery Tooth Extraction    Family History    · Family history of Hyperlipemia    · Family history of Carcinoma Of The Lung (V16 1)    · Family history of Heart Disease (V17 49)   · Family history of Hypertension (V17 49)   · Family history of Osteoporosis (V17 81)    · Family history of Heart Disease (V17 49)    · Family history of Acute Myocardial Infarction (V17 3)   · Family history of Heart Disease (V17 49)    · Family history of Breast Disorders    · Family history of Alcoholism   · Family history of Pancreatic Neoplasm    · Family history of malignant neoplasm of breast (V16 3) (Z80 3)    · Family history of Breast Disorders   · Family history of Carcinoma Of The Lung (V16 1)    Social History    · Being A Social Drinker   · Caffeine Use   · Exercising Regularly   · Marital History - Currently    · Never A Smoker   · Denied: History of Never Used Drugs   · Stopped Drinking Alcohol   · Uses Safety Equipment - Seatbelts    Current Meds   1  Butalbital-APAP-Caffeine -40 MG Oral Capsule; TAKE 1 CAPSULE Every 6 hours;    Therapy: 45XKI0545 to (Evaluate:86Ero2125)  Requested for: 05MNX7959; Last   Rx:88Nva9491 Ordered   2  Prenate DHA 28-0 6-0 4-300 MG Oral Capsule Recorded    Allergies    1  No Known Drug Allergies    2  Animal dander   3  Animal dander - Cats   4  Animal dander - Dogs   5  Dust   6  Grass   7  Mold   8  No Known Food Allergies   9  Pollen   10  Ragweed   11  Trees    Vitals   Recorded: 89LEO7870 85:99UR   Systolic 144   Diastolic 66   Height 5 ft 5 in   Weight 191 lb    BMI Calculated 31 78   BSA Calculated 1 94   Pain Scale 0     Results/Data  Exam description: level II obstetrical ultrasound, transvaginal obstetrical ultrasound  Findings: Please refer to the ultrasound report for additional information  Discussion/Summary    Please refer to the ultrasound report for additional information  The patient was counseled regarding diagnostic results, instructions for management, prognosis, impressions  Thank you very much for allowing me to participate in the care of this patient  If you have any questions, please do not hesitate to contact me        Future Appointments    Signatures   Electronically signed by : OXANA Cid ; Oct  8 2017  4:13PM EST                       (Author)

## 2018-01-15 NOTE — MISCELLANEOUS
Message  p/c from pt stating was seen our ofc for miscarriage  U/S w/ Dr Mohsen Orona non-viable IUP @ 9 0 wks  Pt @ that time was only having dk red spotting  Pt states last evening she had bouts of heavy vag bleeding  This am she has a migraine  Pt is using her Maxalt w/o resolve  Pt states she has been hydrating and now has water stools  Per CT7 pt to go to [facility] for triage  I explained to pt she needs to go to ER for evaluation  Pt will have her  drive her  Pt was agreeable w/ this plan  Active Problems    1  , spontaneous threatened (640 00) (O20 0)   2  Migraine (346 90) (G43 909)   3  Pregnancy, obstetrical care (V22 1) (Z34 90)   4  Pregnancy, obstetrical care, first trimester (V22 1) (Z34 91)    Current Meds   1  Prenate DHA 28-0 6-0 4-300 MG Oral Capsule Recorded    Allergies    1  No Known Drug Allergies    2  Animal dander   3  Animal dander - Cats   4  Animal dander - Dogs   5  Dust   6  Grass   7  Mold   8  No Known Food Allergies   9  Pollen   10  Ragweed   11   Trees    Signatures   Electronically signed by : Randy Baugh, ; Nov 15 2016  8:53AM EST                       (Author)

## 2018-01-16 NOTE — MISCELLANEOUS
Message   Recorded as Task   Date: 01/11/2017 03:16 PM, Created By: Michelle Downey   Task Name: Care Coordination   Assigned To: Sara Wick   Regarding Patient: Dennie Savannah, Status: In Progress   Murali Diaz - 11 Jan 2017 3:16 PM     TASK CREATED  Caller: Self; Care Coordination; [phone] (Home); [phone] (Work)  Patient had a miscarriage 11/12/16, 3 weeks of bleeding,  now has a +HPT  I scheduled her routine OB appointments  She mentioned that you said she could have an early US with her next pregnancy  I did not see that in your note  Please advise if you would like that scheduled  Fernandez Gomez - 12 Jan 2017 3:28 PM     TASK REPLIED TO: Previously Assigned To [facility],Team           ok for early Mack Torres - 12 Jan 2017 3:37 PM     TASK REASSIGNED: Previously Assigned To Jennifer De Jesus - 13 Jan 2017 11:35 AM     TASK EDITED  l/m on voice mail   Michelle Downey - 13 Jan 2017 11:35 AM     TASK IN PROGRESS   Halima Garner - 13 Jan 2017 3:22 PM     TASK REPLIED TO: Previously Assigned To [facility] with pt today  She has an u/s schedule for 1/26 in [facility] with [facility]G  Active Problems    1  Migraine (346 90) (J08 791)    Current Meds   1  Prenate DHA 28-0 6-0 4-300 MG Oral Capsule Recorded    Allergies    1  No Known Drug Allergies    2  Animal dander   3  Animal dander - Cats   4  Animal dander - Dogs   5  Dust   6  Grass   7  Mold   8  No Known Food Allergies   9  Pollen   10  Ragweed   11   Trees    Signatures   Electronically signed by : Laura Rhodes, ; Jan 13 2017  3:22PM EST                       (Author)

## 2018-01-17 NOTE — MISCELLANEOUS
Message   Recorded as Task   Date: 02/10/2017 08:56 AM, Created By: Dexter Rebollar   Task Name: Miscellaneous   Assigned To: Rell Dolan   Regarding Patient: Ronny Nyhan, Status: In Progress   Comment:    Kalyani Farmer - 10 Feb 2017 8:56 AM     TASK CREATED  Caller: Self; [phone] (Home); [phone] (Work)  pt called in regards to appt today w/ g87 pt canceled appt pt would like to see if g87 can answer her questions over the phone instead of appt please advise pt @ [phone]   Joselin Guadarrama - 10 Feb 2017 9:14 AM     TASK REASSIGNED: Previously Assigned To Dorita Munguai - 10 Feb 2017 9:40 AM     TASK IN PROGRESS   Lupe Moore - 10 Feb 2017 9:48 AM     TASK REPLIED TO: Previously Assigned To [facility],Team      spoke w/ pt  there is another task going  Active Problems    1  , spontaneous (634 90) (O03 9)   2  Currently pregnant (V22 2) (Z33 1)   3  History of recurrent miscarriages (N96)   4  Migraine (346 90) (G43 909)   5  Pelvic pain (R10 2)   6  Pregnancy (V22 2) (Z33 1)   7  Pregnancy with threatened  (640 00) (O20 0)    Current Meds   1  Oxycodone-Acetaminophen 5-325 MG Oral Tablet (Percocet); TAKE 1 TABLET EVERY 4   HOURS AS NEEDED FOR PAIN;   Therapy: 01ZBS4302 to (Evaluate:49Bbc9660); Last Rx:98Wvz2480 Ordered   2  Prenate DHA 28-0 6-0 4-300 MG Oral Capsule Recorded    Allergies    1  No Known Drug Allergies    2  Animal dander   3  Animal dander - Cats   4  Animal dander - Dogs   5  Dust   6  Grass   7  Mold   8  No Known Food Allergies   9  Pollen   10  Ragweed   11   Trees    Signatures   Electronically signed by : Shay Fall, ; Feb 10 2017  9:49AM EST                       (Author)

## 2018-01-17 NOTE — MISCELLANEOUS
Message   Recorded as Task   Date: 2017 01:04 PM, Created By: Halima Garner   Task Name: Care Coordination   Assigned To: Sara Wick   Regarding Patient: Dennie Savannah, Status: In Progress   CommentAlnilay Montes - 2017 1:04 PM     TASK CREATED  Pt called office  States she had a miscarriage yesterday  Pt had recent SAB 2016 - she did not have another period prior to becoming pregnant again  Yesterday pt began bleeding heavily and cramping  She is still bleeding today, has passed clots and tissue  Pt says pain is manageable  She changes a pad every 2 hours  Per CG (oncall) to go for HCG today and then weekly until negative  F/u this week - scheduled for Friday  Halima Garner - 2017 1:04 PM     TASK IN PROGRESS   Michelle Downey - 2017 9:19 AM     TASK EDITED  Margarita Munoz will be going today for Hcg, she will be waiting for you to fax her the order to her at [phone], this her personal fax, no cover sheet needed  Halima Garner - 2017 10:58 AM     TASK EDITED  Faxed per pt request    Halima Garner - 2017 10:58 AM     TASK IN PROGRESS   Halima Garner - 2017 11:03 AM     TASK EDITED  Spoke with pt  She reports a passing a large clot followed by very heavy bleeding that did soak her underwear and pants while at work  Pt did have formal u/s on Friday that showed viable IUP and large subchorionic hemorrhage  Spoke with KTM  Pt to hydrate, rest, continue PNAT vitamin and monitor  Pt to keep  f/u apt to assess health of pregnancy  Pt to go to ER with bleeding >1pad/hr for multiple hours or with onset of dizziness/SOB/concern for LOC  Pt to use Tylenol for cramping pain  Per KTM pt may be off work this week if she would prefer given heavy bleeding, however, there is no clinical reason she would have to be  Pt aware of this plan  She will c/b with concerns prior to  apt  Active Problems    1   , spontaneous (533 14) (O03 9)   2  Currently pregnant (V22 2) (Z33 1)   3  Migraine (346 90) (G43 909)   4  Pregnancy (V22 2) (Z33 1)    Current Meds   1  Prenate DHA 28-0 6-0 4-300 MG Oral Capsule Recorded    Allergies    1  No Known Drug Allergies    2  Animal dander   3  Animal dander - Cats   4  Animal dander - Dogs   5  Dust   6  Grass   7  Mold   8  No Known Food Allergies   9  Pollen   10  Ragweed   11   Trees    Signatures   Electronically signed by : Leslie Calvillo, ; Jan 26 2017  6:02PM EST                       (Author)

## 2018-01-17 NOTE — MISCELLANEOUS
Message   Recorded as Task   Date: 2017 01:10 PM, Created By: Kristen Bower   Task Name: Medical Complaint Callback   Assigned To: Ira Corona   Regarding Patient: Juan Manuel Bella, Status: In Progress   Comment:    Traci Angulo  1:10 PM     TASK CREATED  Caller: Self; Medical Complaint; [phone] (Home)  Patient started Cytotec yesterday and was seen by Dr Rojas Client earlier yesterday  She doesn't think it fully worked and would like to know her next step  Rocio Sanchez Would like to speak with Dr Rojas Client  [phone]   Traci Angulo - 2017 3:40 PM     TASK REASSIGNED: Previously Assigned To [id number] [place] [address] - 2017 3:41 PM     TASK EDITED  please call asap   I assigned to incorrect task bin   [facility] - 2017 3:46 PM     TASK IN PROGRESS   Lupe Moore - 2017 3:51 PM     TASK REPLIED TO: Previously Assigned To [facility],Team     spoke w/ pt   pt stated after cytotec was placed yesterday at home she did pass a few clots and 3 of the 4 cytotec tabs  Pt is unsure if she needs another dose  Will contact Dr Rojas Client and advise pt  Lupe Moore - 2017 4:10 PM     TASK REPLIED TO: Previously Assigned To [facility],Team      unable to contact dr ferrell wishes to be seen tomorrow  apt for [place] ofc @ 1240p  Active Problems    1  , spontaneous (634 90) (O03 9)   2  Currently pregnant (V22 2) (Z33 1)   3  History of recurrent miscarriages (N96)   4  Migraine (346 90) (G43 909)   5  Pelvic pain (R10 2)   6  Pregnancy (V22 2) (Z33 1)   7  Pregnancy with threatened  (640 00) (O20 0)    Current Meds   1  Oxycodone-Acetaminophen 5-325 MG Oral Tablet (Percocet); TAKE 1 TABLET EVERY 4   HOURS AS NEEDED FOR PAIN;   Therapy: 99QRW6618 to (Evaluate:17Adm2219); Last Rx:2017 Ordered   2  Prenate DHA 28-0 6-0 4-300 MG Oral Capsule Recorded    Allergies    1  No Known Drug Allergies    2  Animal dander   3  Animal dander - Cats   4  Animal dander - Dogs   5  Dust   6  Grass   7  Mold   8  No Known Food Allergies   9  Pollen   10  Ragweed   11   Trees    Signatures   Electronically signed by : Giorgio Oviedo, ; Feb 9 2017  4:11PM EST                       (Author)

## 2018-01-22 VITALS
BODY MASS INDEX: 32.32 KG/M2 | SYSTOLIC BLOOD PRESSURE: 104 MMHG | WEIGHT: 194 LBS | DIASTOLIC BLOOD PRESSURE: 70 MMHG | HEIGHT: 65 IN

## 2018-01-22 VITALS
DIASTOLIC BLOOD PRESSURE: 70 MMHG | WEIGHT: 199 LBS | BODY MASS INDEX: 33.15 KG/M2 | HEIGHT: 65 IN | SYSTOLIC BLOOD PRESSURE: 134 MMHG

## 2018-01-22 VITALS — SYSTOLIC BLOOD PRESSURE: 102 MMHG | DIASTOLIC BLOOD PRESSURE: 62 MMHG | WEIGHT: 186 LBS | BODY MASS INDEX: 30.95 KG/M2

## 2018-01-22 VITALS
HEIGHT: 65 IN | DIASTOLIC BLOOD PRESSURE: 70 MMHG | WEIGHT: 174 LBS | SYSTOLIC BLOOD PRESSURE: 120 MMHG | BODY MASS INDEX: 28.99 KG/M2

## 2018-01-22 VITALS
DIASTOLIC BLOOD PRESSURE: 80 MMHG | WEIGHT: 178.13 LBS | HEIGHT: 65 IN | BODY MASS INDEX: 29.68 KG/M2 | SYSTOLIC BLOOD PRESSURE: 110 MMHG

## 2018-01-22 VITALS
HEIGHT: 65 IN | DIASTOLIC BLOOD PRESSURE: 62 MMHG | WEIGHT: 176 LBS | SYSTOLIC BLOOD PRESSURE: 118 MMHG | BODY MASS INDEX: 29.32 KG/M2

## 2018-01-23 NOTE — PROGRESS NOTES
DEC 29 2017         RE: Eugene Collins                               To: [facility]  MR#: [id number]                                    [facility]  : [address]: [id number]                             [address]  6    (Exam #: [id number])                           Fax: [phone]      The LMP of this 27year old,  G4, P1-0-2-1 patient was MAY 17 2017, giving   her an CAT of 2018 and a current gestational age of 26 weeks 2 days   by dates  A sonographic examination was performed on DEC 29 2017 using   real time equipment  The ultrasound examination was performed using   abdominal technique  The patient has a BMI of 34 4  Her blood pressure   today was 114/58  Cardiac motion was observed at 134 bpm       INDICATIONS      obesity   fetal growth      Exam Types      Level I      RESULTS      Fetus # 1 of 1   Vertex presentation   Fetal growth appeared normal   Placenta Location = Posterior   No placenta previa      MEASUREMENTS (* Included In Average GA)      AC              28 5 cm        32 weeks 4 days* (53%)   BPD              8 5 cm        34 weeks 1 day * (79%)   HC              29 7 cm        32 weeks 4 days* (31%)   Femur            6 4 cm        32 weeks 4 days* (60%)      Cerebellum       4 3 cm        34 weeks 5 days      HC/AC           1 04   FL/AC           0 22   FL/BPD          0 75   EFW (Ac/Fl/Hc)  2023 grams - 4 lbs 7 oz                 (49%)      THE AVERAGE GESTATIONAL AGE is 33 weeks 0 days +/- 21 days  AMNIOTIC FLUID      Q1: 3 5      Q2: 4 7      Q3: 6 2      Q4: 4 2   YO Total = 18 5 cm   Amniotic Fluid: Normal      ANATOMY COMMENTS      Fetal anatomy has been previously assessed and documented in our Center   and no anomalies were identified  No fetal structural abnormality is   identified on the Level I survey today   Follow up intracranial anatomy   (calvarium and cerebellum), cardiac anatomy (RVOT), diaphragm, stomach,   kidneys, and bladder appear normal       The prior 2 fibroids are not seen on her US today  IMPRESSION      Evans IUP   33 weeks and 0 days by this ultrasound  (CAT=2018)   Vertex presentation   Fetal growth appeared normal   Regular fetal heart rate of 134 bpm   Posterior placenta   No placenta previa      GENERAL COMMENT      The patient has no complaints today  She reports regular fetal movements   and denies problems related to hypertension, gestational diabetes,    labor, or vaginal bleeding  Her prenatal course has apparently been   unremarkable in the interim since her most recent visit here  RECOMMENDATION      Growth Ultrasound: As indicated      ZENAIDA Falcon M D     Maternal-Fetal Medicine   Electronically signed 17 12:51

## 2018-01-24 VITALS — BODY MASS INDEX: 34.28 KG/M2 | DIASTOLIC BLOOD PRESSURE: 68 MMHG | WEIGHT: 206 LBS | SYSTOLIC BLOOD PRESSURE: 124 MMHG

## 2018-01-24 VITALS
DIASTOLIC BLOOD PRESSURE: 58 MMHG | WEIGHT: 207.04 LBS | HEART RATE: 84 BPM | HEIGHT: 65 IN | BODY MASS INDEX: 34.49 KG/M2 | SYSTOLIC BLOOD PRESSURE: 114 MMHG

## 2018-01-24 VITALS
HEIGHT: 65 IN | BODY MASS INDEX: 34.49 KG/M2 | WEIGHT: 207 LBS | SYSTOLIC BLOOD PRESSURE: 110 MMHG | DIASTOLIC BLOOD PRESSURE: 60 MMHG

## 2018-01-24 VITALS
WEIGHT: 203 LBS | BODY MASS INDEX: 33.82 KG/M2 | HEIGHT: 65 IN | SYSTOLIC BLOOD PRESSURE: 112 MMHG | DIASTOLIC BLOOD PRESSURE: 70 MMHG

## 2018-01-26 ENCOUNTER — ROUTINE PRENATAL (OUTPATIENT)
Dept: OBGYN CLINIC | Facility: CLINIC | Age: 33
End: 2018-01-26

## 2018-01-26 ENCOUNTER — APPOINTMENT (OUTPATIENT)
Dept: LAB | Facility: HOSPITAL | Age: 33
End: 2018-01-26
Payer: COMMERCIAL

## 2018-01-26 VITALS
HEIGHT: 65 IN | SYSTOLIC BLOOD PRESSURE: 118 MMHG | DIASTOLIC BLOOD PRESSURE: 70 MMHG | BODY MASS INDEX: 35.09 KG/M2 | WEIGHT: 210.6 LBS

## 2018-01-26 DIAGNOSIS — Z3A.36 36 WEEKS GESTATION OF PREGNANCY: Primary | ICD-10-CM

## 2018-01-26 PROBLEM — N96 HISTORY OF RECURRENT MISCARRIAGES: Status: ACTIVE | Noted: 2017-02-08

## 2018-01-26 PROBLEM — D25.1 INTRAMURAL LEIOMYOMA OF UTERUS: Status: ACTIVE | Noted: 2017-12-15

## 2018-01-26 PROCEDURE — PNV: Performed by: OBSTETRICS & GYNECOLOGY

## 2018-01-26 PROCEDURE — 87653 STREP B DNA AMP PROBE: CPT | Performed by: OBSTETRICS & GYNECOLOGY

## 2018-01-26 NOTE — PROGRESS NOTES
VTX  by limited office ultrasound  GBS performed today   OFF work from 2/5/18 till delivery for the indication of work distance from [facility]

## 2018-01-29 LAB — GP B STREP DNA SPEC QL NAA+PROBE: NORMAL

## 2018-02-02 ENCOUNTER — ROUTINE PRENATAL (OUTPATIENT)
Dept: OBGYN CLINIC | Facility: CLINIC | Age: 33
End: 2018-02-02

## 2018-02-02 VITALS — BODY MASS INDEX: 35.45 KG/M2 | WEIGHT: 213 LBS | SYSTOLIC BLOOD PRESSURE: 110 MMHG | DIASTOLIC BLOOD PRESSURE: 68 MMHG

## 2018-02-02 DIAGNOSIS — Z34.83 MULTIGRAVIDA IN THIRD TRIMESTER: Primary | ICD-10-CM

## 2018-02-02 PROCEDURE — PNV: Performed by: PHYSICIAN ASSISTANT

## 2018-02-02 NOTE — PROGRESS NOTES
I have reviewed the notes, assessments, and/or procedures performed by Nicolás Barger, I concur with her/his documentation of [address] [place] [address] Street

## 2018-02-02 NOTE — PROGRESS NOTES
Problem List Items Addressed This Visit     Multigravida in third trimester - Primary     Feels well  Good fetal movement  It's a girl  28 week labs were normal, declined genetic testing  No contractions, loss of fluid  Received TDAP and flu shot  Has breast pump at home  Still with some sciatica  GBS negative  Labor precautions reviewed  Has note to stop work but employer says with no medical indication to stop work she cannot apply for disability; she isn't sure about what she will do about this - take vacation time or stay at work

## 2018-02-02 NOTE — ASSESSMENT & PLAN NOTE
Feels well  Good fetal movement  It's a girl  28 week labs were normal, declined genetic testing  No contractions, loss of fluid  Received TDAP and flu shot  Has breast pump at home  Still with some sciatica  GBS negative  Labor precautions reviewed  Has note to stop work but employer says with no medical indication to stop work she cannot apply for disability; she isn't sure about what she will do about this - take vacation time or stay at work

## 2018-02-06 ENCOUNTER — ROUTINE PRENATAL (OUTPATIENT)
Dept: OBGYN CLINIC | Facility: CLINIC | Age: 33
End: 2018-02-06

## 2018-02-06 VITALS — SYSTOLIC BLOOD PRESSURE: 104 MMHG | DIASTOLIC BLOOD PRESSURE: 66 MMHG | BODY MASS INDEX: 35.54 KG/M2 | WEIGHT: 213.6 LBS

## 2018-02-06 DIAGNOSIS — Z34.83 MULTIGRAVIDA IN THIRD TRIMESTER: Primary | ICD-10-CM

## 2018-02-06 PROCEDURE — PNV: Performed by: OBSTETRICS & GYNECOLOGY

## 2018-02-06 NOTE — PROGRESS NOTES
[facility]. Information added to AVS.  Jennifer Núñez RN CRM      [address]: No  Reason Outside [place]: Physician referred to specific agency ([facility])  [address]: No  Physical Therapy Consult: Yes  Occupational Therapy Consult: No  Speech Therapy Consult: No  Current Support Network: Own Home, Lives Alone (family will be staying with pt during recovery)  Confirm Follow Up Transport: Family  Plan discussed with Pt/Family/Caregiver: Yes  Freedom of Choice Offered: Yes  Discharge Location  Discharge Placement: Home with home health Feels OK  Continues w/ sciatica  Rare ctx    Discussed maternity cradle if she can get one ASAP  Labor precautions reviewed

## 2018-02-13 ENCOUNTER — ROUTINE PRENATAL (OUTPATIENT)
Dept: OBGYN CLINIC | Facility: CLINIC | Age: 33
End: 2018-02-13

## 2018-02-13 VITALS — DIASTOLIC BLOOD PRESSURE: 60 MMHG | WEIGHT: 213.4 LBS | SYSTOLIC BLOOD PRESSURE: 102 MMHG | BODY MASS INDEX: 35.51 KG/M2

## 2018-02-13 DIAGNOSIS — Z34.83 MULTIGRAVIDA IN THIRD TRIMESTER: Primary | ICD-10-CM

## 2018-02-13 PROCEDURE — PNV: Performed by: OBSTETRICS & GYNECOLOGY

## 2018-02-13 NOTE — PROGRESS NOTES
Patient doing okay, anxious for baby to get here  Some irregular contractions  Labor precautions reviewed  Could consider IOL after EDC if desires

## 2018-02-14 ENCOUNTER — TELEPHONE (OUTPATIENT)
Dept: OBGYN CLINIC | Facility: CLINIC | Age: 33
End: 2018-02-14

## 2018-02-14 NOTE — TELEPHONE ENCOUNTER
Pt called office today, left voicemail message  Pt states she is 44 weeks pregnant, 3year old son has been diagnosed with flu  Pt wants to know if she should take Tamiflu? Pt can be reached @ [phone]

## 2018-02-21 ENCOUNTER — ROUTINE PRENATAL (OUTPATIENT)
Dept: OBGYN CLINIC | Facility: CLINIC | Age: 33
End: 2018-02-21

## 2018-02-21 VITALS — WEIGHT: 214.4 LBS | BODY MASS INDEX: 35.68 KG/M2 | SYSTOLIC BLOOD PRESSURE: 122 MMHG | DIASTOLIC BLOOD PRESSURE: 82 MMHG

## 2018-02-21 DIAGNOSIS — Z34.03 ENCOUNTER FOR SUPERVISION OF NORMAL FIRST PREGNANCY IN THIRD TRIMESTER: Primary | ICD-10-CM

## 2018-02-21 PROCEDURE — PNV: Performed by: OBSTETRICS & GYNECOLOGY

## 2018-02-21 NOTE — PROGRESS NOTES
Patient has no complaints   there are no contractions and no leakage of fluid   discussed pregnancy care after her due date

## 2018-02-26 NOTE — PROGRESS NOTES
Chief Complaint  Flu vaccine given in R Deltoid without any difficulty -cmt  Active Problems    1  History of recurrent miscarriages (N96)   2  Migraine (346 90) (G43 909)    Current Meds   1  Butalbital-APAP-Caffeine -40 MG Oral Capsule; TAKE 1 CAPSULE Every 6 hours; Therapy: 00RZL6522 to (Evaluate:35Xph9319)  Requested for: 16Jpr0614; Last   Rx:05Mkn6946 Ordered   2  Prenate DHA 28-0 6-0 4-300 MG Oral Capsule Recorded    Allergies    1  No Known Drug Allergies    2  Animal dander   3  Animal dander - Cats   4  Animal dander - Dogs   5  Dust   6  Grass   7  Mold   8  No Known Food Allergies   9  Pollen   10  Ragweed   11  Trees    Vitals  Signs    Systolic: 711, LUE, Sitting  Diastolic: 62, LUE, Sitting  Weight: 186 lb   BMI Calculated: 30 95  BSA Calculated: 1 92  LMP: 88EXQ3883    Future Appointments    Date/Time Provider Specialty Site   01/26/2018 10:00 AM OXANA Acosta  Obstetrics/Gynecology [facility]   02/06/2018 10:00 AM Ernst Goldberg DO Obstetrics/Gynecology [facility]   02/02/2018 02:00 PM Jamie Barrera [facility] Obstetrics/Gynecology [facility]   02/13/2018 10:20 OXANA Wadsworth  Obstetrics/Gynecology [facility]   02/21/2018 09:40 AM OXANA Vaca   Obstetrics/Gynecology [facility]     Signatures   Electronically signed by : OXANA Garcia ; Jan 12 2018  4:57PM EST                       (Author)

## 2018-03-01 ENCOUNTER — HOSPITAL ENCOUNTER (OUTPATIENT)
Dept: LABOR AND DELIVERY | Facility: HOSPITAL | Age: 33
Discharge: HOME | End: 2018-03-01
Payer: COMMERCIAL

## 2018-03-01 ENCOUNTER — ROUTINE PRENATAL (OUTPATIENT)
Dept: OBGYN CLINIC | Facility: CLINIC | Age: 33
End: 2018-03-01

## 2018-03-01 ENCOUNTER — HOSPITAL ENCOUNTER (INPATIENT)
Facility: HOSPITAL | Age: 33
LOS: 3 days | Discharge: HOME | End: 2018-03-04
Attending: OBSTETRICS & GYNECOLOGY | Admitting: OBSTETRICS & GYNECOLOGY
Payer: COMMERCIAL

## 2018-03-01 VITALS — SYSTOLIC BLOOD PRESSURE: 120 MMHG | BODY MASS INDEX: 35.81 KG/M2 | WEIGHT: 215.2 LBS | DIASTOLIC BLOOD PRESSURE: 72 MMHG

## 2018-03-01 DIAGNOSIS — Z3A.41 41 WEEKS GESTATION OF PREGNANCY: Primary | ICD-10-CM

## 2018-03-01 DIAGNOSIS — Z34.83 MULTIGRAVIDA IN THIRD TRIMESTER: Primary | ICD-10-CM

## 2018-03-01 PROBLEM — O48.0 41 WEEKS GESTATION OF PREGNANCY: Status: ACTIVE | Noted: 2018-03-01

## 2018-03-01 LAB
ABO GROUP BLD: NORMAL
BASOPHILS # BLD AUTO: 0.01 THOUSANDS/ΜL (ref 0–0.1)
BASOPHILS NFR BLD AUTO: 0 % (ref 0–1)
BLD GP AB SCN SERPL QL: NEGATIVE
EOSINOPHIL # BLD AUTO: 0.06 THOUSAND/ΜL (ref 0–0.61)
EOSINOPHIL NFR BLD AUTO: 1 % (ref 0–6)
ERYTHROCYTE [DISTWIDTH] IN BLOOD BY AUTOMATED COUNT: 13.5 % (ref 11.6–15.1)
HCT VFR BLD AUTO: 35.8 % (ref 34.8–46.1)
HGB BLD-MCNC: 12.4 G/DL (ref 11.5–15.4)
LYMPHOCYTES # BLD AUTO: 3.65 THOUSANDS/ΜL (ref 0.6–4.47)
LYMPHOCYTES NFR BLD AUTO: 32 % (ref 14–44)
MCH RBC QN AUTO: 29.8 PG (ref 26.8–34.3)
MCHC RBC AUTO-ENTMCNC: 34.6 G/DL (ref 31.4–37.4)
MCV RBC AUTO: 86 FL (ref 82–98)
MONOCYTES # BLD AUTO: 0.76 THOUSAND/ΜL (ref 0.17–1.22)
MONOCYTES NFR BLD AUTO: 7 % (ref 4–12)
NEUTROPHILS # BLD AUTO: 7.07 THOUSANDS/ΜL (ref 1.85–7.62)
NEUTS SEG NFR BLD AUTO: 60 % (ref 43–75)
NRBC BLD AUTO-RTO: 0 /100 WBCS
PLATELET # BLD AUTO: 254 THOUSANDS/UL (ref 149–390)
PMV BLD AUTO: 11.4 FL (ref 8.9–12.7)
RBC # BLD AUTO: 4.16 MILLION/UL (ref 3.81–5.12)
RH BLD: POSITIVE
SPECIMEN EXPIRATION DATE: NORMAL
WBC # BLD AUTO: 11.6 THOUSAND/UL (ref 4.31–10.16)

## 2018-03-01 PROCEDURE — 86850 RBC ANTIBODY SCREEN: CPT | Performed by: OBSTETRICS & GYNECOLOGY

## 2018-03-01 PROCEDURE — 85025 COMPLETE CBC W/AUTO DIFF WBC: CPT | Performed by: OBSTETRICS & GYNECOLOGY

## 2018-03-01 PROCEDURE — 86901 BLOOD TYPING SEROLOGIC RH(D): CPT | Performed by: OBSTETRICS & GYNECOLOGY

## 2018-03-01 PROCEDURE — 3E033VJ INTRODUCTION OF OTHER HORMONE INTO PERIPHERAL VEIN, PERCUTANEOUS APPROACH: ICD-10-PCS | Performed by: OBSTETRICS & GYNECOLOGY

## 2018-03-01 PROCEDURE — 4A1HXCZ MONITORING OF PRODUCTS OF CONCEPTION, CARDIAC RATE, EXTERNAL APPROACH: ICD-10-PCS | Performed by: OBSTETRICS & GYNECOLOGY

## 2018-03-01 PROCEDURE — 86592 SYPHILIS TEST NON-TREP QUAL: CPT | Performed by: OBSTETRICS & GYNECOLOGY

## 2018-03-01 PROCEDURE — PNV: Performed by: OBSTETRICS & GYNECOLOGY

## 2018-03-01 PROCEDURE — 86900 BLOOD TYPING SEROLOGIC ABO: CPT | Performed by: OBSTETRICS & GYNECOLOGY

## 2018-03-01 RX ORDER — OXYTOCIN/RINGER'S LACTATE 30/500 ML
1-30 PLASTIC BAG, INJECTION (ML) INTRAVENOUS
Status: DISCONTINUED | OUTPATIENT
Start: 2018-03-01 | End: 2018-03-02

## 2018-03-01 RX ORDER — SODIUM CHLORIDE, SODIUM LACTATE, POTASSIUM CHLORIDE, CALCIUM CHLORIDE 600; 310; 30; 20 MG/100ML; MG/100ML; MG/100ML; MG/100ML
125 INJECTION, SOLUTION INTRAVENOUS CONTINUOUS
Status: DISCONTINUED | OUTPATIENT
Start: 2018-03-01 | End: 2018-03-02

## 2018-03-01 RX ADMIN — Medication 2 MILLI-UNITS/MIN: at 22:33

## 2018-03-01 RX ADMIN — SODIUM CHLORIDE, SODIUM LACTATE, POTASSIUM CHLORIDE, AND CALCIUM CHLORIDE 125 ML/HR: .6; .31; .03; .02 INJECTION, SOLUTION INTRAVENOUS at 22:33

## 2018-03-02 ENCOUNTER — ANESTHESIA (INPATIENT)
Dept: LABOR AND DELIVERY | Facility: HOSPITAL | Age: 33
End: 2018-03-02
Payer: COMMERCIAL

## 2018-03-02 ENCOUNTER — ANESTHESIA EVENT (INPATIENT)
Dept: LABOR AND DELIVERY | Facility: HOSPITAL | Age: 33
End: 2018-03-02
Payer: COMMERCIAL

## 2018-03-02 LAB
BASE EXCESS BLDCOA CALC-SCNC: -2.2 MMOL/L (ref 3–11)
BASE EXCESS BLDCOV CALC-SCNC: -3.5 MMOL/L (ref 1–9)
HCO3 BLDCOA-SCNC: 25.7 MMOL/L (ref 17.3–27.3)
HCO3 BLDCOV-SCNC: 21.2 MMOL/L (ref 12.2–28.6)
O2 CT VFR BLDCOA CALC: 7.5 ML/DL
OXYHGB MFR BLDCOA: 29.8 %
OXYHGB MFR BLDCOV: 77.8 %
PCO2 BLDCOA: 55.2 MM[HG] (ref 30–60)
PCO2 BLDCOV: 37.7 MM HG (ref 27–43)
PH BLDCOA: 7.29 [PH] (ref 7.23–7.43)
PH BLDCOV: 7.37 [PH] (ref 7.19–7.49)
PO2 BLDCOA: 17.7 MM HG (ref 5–25)
PO2 BLDCOV: 37.3 MM HG (ref 15–45)
RPR SER QL: NORMAL
SAO2 % BLDCOV: 20 ML/DL

## 2018-03-02 PROCEDURE — 82805 BLOOD GASES W/O2 SATURATION: CPT | Performed by: OBSTETRICS & GYNECOLOGY

## 2018-03-02 PROCEDURE — 59400 OBSTETRICAL CARE: CPT | Performed by: OBSTETRICS & GYNECOLOGY

## 2018-03-02 PROCEDURE — 0KQM0ZZ REPAIR PERINEUM MUSCLE, OPEN APPROACH: ICD-10-PCS | Performed by: OBSTETRICS & GYNECOLOGY

## 2018-03-02 RX ORDER — BUPIVACAINE HYDROCHLORIDE 2.5 MG/ML
INJECTION, SOLUTION INFILTRATION; PERINEURAL AS NEEDED
Status: DISCONTINUED | OUTPATIENT
Start: 2018-03-02 | End: 2018-03-02 | Stop reason: SURG

## 2018-03-02 RX ORDER — ONDANSETRON 2 MG/ML
INJECTION INTRAMUSCULAR; INTRAVENOUS
Status: COMPLETED
Start: 2018-03-02 | End: 2018-03-02

## 2018-03-02 RX ORDER — ONDANSETRON 2 MG/ML
4 INJECTION INTRAMUSCULAR; INTRAVENOUS EVERY 6 HOURS PRN
Status: DISCONTINUED | OUTPATIENT
Start: 2018-03-02 | End: 2018-03-02

## 2018-03-02 RX ORDER — LIDOCAINE HYDROCHLORIDE AND EPINEPHRINE 15; 5 MG/ML; UG/ML
INJECTION, SOLUTION EPIDURAL AS NEEDED
Status: DISCONTINUED | OUTPATIENT
Start: 2018-03-02 | End: 2018-03-02 | Stop reason: SURG

## 2018-03-02 RX ORDER — OXYTOCIN/RINGER'S LACTATE 30/500 ML
2 PLASTIC BAG, INJECTION (ML) INTRAVENOUS CONTINUOUS
Status: DISCONTINUED | OUTPATIENT
Start: 2018-03-02 | End: 2018-03-02

## 2018-03-02 RX ORDER — OXYTOCIN/RINGER'S LACTATE 30/500 ML
1-30 PLASTIC BAG, INJECTION (ML) INTRAVENOUS
Status: DISCONTINUED | OUTPATIENT
Start: 2018-03-02 | End: 2018-03-02

## 2018-03-02 RX ADMIN — LIDOCAINE HYDROCHLORIDE AND EPINEPHRINE 3 ML: 15; 5 INJECTION, SOLUTION EPIDURAL at 11:49

## 2018-03-02 RX ADMIN — Medication 4 MILLI-UNITS/MIN: at 09:06

## 2018-03-02 RX ADMIN — ONDANSETRON 4 MG: 2 INJECTION INTRAMUSCULAR; INTRAVENOUS at 14:02

## 2018-03-02 RX ADMIN — SODIUM CHLORIDE, SODIUM LACTATE, POTASSIUM CHLORIDE, AND CALCIUM CHLORIDE 999 ML/HR: .6; .31; .03; .02 INJECTION, SOLUTION INTRAVENOUS at 13:00

## 2018-03-02 RX ADMIN — SODIUM CHLORIDE, SODIUM LACTATE, POTASSIUM CHLORIDE, AND CALCIUM CHLORIDE 125 ML/HR: .6; .31; .03; .02 INJECTION, SOLUTION INTRAVENOUS at 11:55

## 2018-03-02 RX ADMIN — ROPIVACAINE HYDROCHLORIDE: 2 INJECTION, SOLUTION EPIDURAL; INFILTRATION at 11:50

## 2018-03-02 RX ADMIN — SODIUM CHLORIDE, SODIUM LACTATE, POTASSIUM CHLORIDE, AND CALCIUM CHLORIDE 125 ML/HR: .6; .31; .03; .02 INJECTION, SOLUTION INTRAVENOUS at 02:38

## 2018-03-02 RX ADMIN — Medication 1 TABLET: at 09:06

## 2018-03-02 RX ADMIN — SODIUM CHLORIDE, SODIUM LACTATE, POTASSIUM CHLORIDE, AND CALCIUM CHLORIDE 125 ML/HR: .6; .31; .03; .02 INJECTION, SOLUTION INTRAVENOUS at 17:00

## 2018-03-02 RX ADMIN — BUPIVACAINE HYDROCHLORIDE 5 ML: 2.5 INJECTION, SOLUTION INFILTRATION; PERINEURAL at 11:49

## 2018-03-02 NOTE — OB LABOR/OXYTOCIN SAFETY PROGRESS
Oxytocin Safety Progress Check Note - Gil Javed LIA Fenton Molina 27 y o  female MRN: [id number]    Unit/Bed#: -01 Encounter: [id number]    Obstetric History       T1      L1     SAB2   TAB0   Ectopic0   Multiple0   Live Births1      Gestational Age: 38w3d  Dose (keisha-units/min) Oxytocin: 2 keisha-units/min  Contraction Frequency (minutes): 5-6  Contraction Quality: Mild  Tachysystole: No   Dilation: 3        Effacement (%): 70  Station: -3  Baseline Rate: 110 bpm  Fetal Heart Rate: 108 BPM  FHR Category: Category I          Notes/comments:     No change on low dose pitocin  Will start pitocin titration now, increasing by 2mU/min every 20 minutes until adequate contraction pattern  FHT Cat I  Patient comfortable, declines epidural or pain medication at this time       Will be discussed with Dr Keyshawn Saeed MD 3/2/2018 8:09 AM

## 2018-03-02 NOTE — OB LABOR/OXYTOCIN SAFETY PROGRESS
Oxytocin Safety Progress Check Note - Jono FITZGERALD Albert Whitney 27 y o  female MRN: [id number]    Unit/Bed#: -01 Encounter: [id number]    Obstetric History       T1      L1     SAB2   TAB0   Ectopic0   Multiple0   Live Births1      Gestational Age: 38w3d  Dose (keisha-units/min) Oxytocin: 2 keisha-units/min  Contraction Frequency (minutes): 3-4  Contraction Quality: Mild  Tachysystole: No   Dilation: 3        Effacement (%): 70  Station: -3  Baseline Rate: 120 bpm  Fetal Heart Rate: 105 BPM  FHR Category: Category II          Notes/comments:   Pt uncomfortable per nursing report  Pt appears comfortable on examination and reports no significant increase in discomfort from previous examination  Small amount of cervical change appreciated  FHT Cat II with fetal bradycardia 105bpm baseline  Moderate variability with accelerations noted  Pt given juice, fluid bolus, and position changes   Will cont to monitor closely  Recheck in 1-2hr, sooner if uncomfortable    Discussed with Dr Ubaldo Pan DO 3/2/2018 2:36 AM

## 2018-03-02 NOTE — OB LABOR/OXYTOCIN SAFETY PROGRESS
Oxytocin Safety Progress Check Note - Aleks Session A Hero Steward 27 y o  female MRN: [id number]    Unit/Bed#: -01 Encounter: [id number]    Obstetric History       T1      L1     SAB2   TAB0   Ectopic0   Multiple0   Live Births1      Gestational Age: 38w3d  Dose (keisha-units/min) Oxytocin: 8 keisha-units/min  Contraction Frequency (minutes): 3-5  Contraction Quality: Strong  Tachysystole: No   Dilation: 8-9        Effacement (%): 90  Station: -1  Baseline Rate: 120 bpm  Fetal Heart Rate: 115 BPM  FHR Category: Category I          Notes/comments:     Patient Paulino Morales 668 with meconium fluids at 1315  She has made excellent change with pitocin of 8mU/min  She is comfortable with epidural but nauseous  Will try zofran  Strip CAT I      To be discussed with Dr Zafar Partida MD 3/2/2018 1:45 PM

## 2018-03-02 NOTE — OB LABOR/OXYTOCIN SAFETY PROGRESS
Oxytocin Safety Progress Check Note - Lori Karen Menon Arn 27 y o  female MRN: [id number]    Unit/Bed#:  Triage  Encounter: [id number]    Obstetric History       T1      L1     SAB2   TAB0   Ectopic0   Multiple0   Live Births1      Gestational Age: 41w1d  Dose (keisha-units/min) Oxytocin: 2 keisha-units/min  Contraction Frequency (minutes): 3-4  Contraction Quality: Mild  Tachysystole: No   Dilation: 2-3        Effacement (%): 70  Station: -3  Baseline Rate: 120 bpm  Fetal Heart Rate: 130 BPM  FHR Category: Category I          Notes/comments:   Pt comfortable  Cervical exam deferred     FHT Cat I, cont to monitor  Recheck if pt uncomfortable          Doug Sarmiento DO 3/1/2018 11:54 PM

## 2018-03-02 NOTE — PROGRESS NOTES
Fetal heart baseline down to 100 pt repositioned, increased fluids, pitocin on hold, dr Brooklynn Hager at bedside

## 2018-03-02 NOTE — DISCHARGE SUMMARY
Discharge Summary - Braden Mckinney 27 y o  female MRN: [id number]    Unit/Bed#: -01 Encounter: [id number]    Admission Date: 3/1/2018     Discharge Date: 3/4/2018    Admitting Diagnosis:   1  Pregnancy at 41w2d  2  Induced labor  3  Late Term Gestation  4  Migraine  5  Intramural leiomyoma of uterus    Discharge Diagnosis: same, delivered    Procedures: spontaneous vaginal delivery    Delivering Attending: Dr Marycarmen Corea  Discharge Attending: Dr Lieberman Co Course:     Braden Mckinney is a 27 y o  [id number] at 41w2d wks who was initially admitted for induction of labor for late term gestation  Initial exam 2-3/70/-3  Pt was induced with pitocin  She received an epidural for anesthesia  SROM at 1315  She progressed well and was complete at @ 1932 and pushing @1934    She delivered a viable female  on 3/2/18 at 1946  Weight 8lbs 14 2oz via spontaneous vaginal delivery  Apgars were 9 (1 min) and 9 (5 min)  Patient tolerated the procedure well and was transferred to recovery in stable condition  Her post-delivery course was uncomplicated  Her postpartum pain was well controlled with oral analgesics  On day of discharge, she was ambulating and able to reasonably perform all ADLs  She was voiding and had appropriate bowel function  Pain was well controlled  She was discharged home on postpartum day #2 without complications  Patient was instructed to follow up with her OB as an outpatient and was given appropriate warnings to call provider if she develops signs of infection or uncontrolled pain  Complications: none apparent    Condition at discharge: good     Discharge instructions/Information to patient and family:   See after visit summary for information provided to patient and family  Provisions for Follow-Up Care:  See after visit summary for information related to follow-up care and any pertinent home health orders        Disposition: Home    Planned Readmission: Marysol Barksdale Sa MD  3/4/2018  8:03 AM

## 2018-03-02 NOTE — H&P
History & Physical - OB/GYN   Catheline Pancoast 27 y o  female MRN: [id number]  Unit/Bed#: LD Triage 2 Encounter: [id number]    23 y o   at 41w1d weeks  She is a patient of [facility]    Chief complaint:  I'm here for my induction of labor    HPI:  Contractions:  yes  Fetal movement:  yes  Vaginal bleeding:   no  Leaking of fluid:  no    Pt reports contractions that are regular but not intense throughout the course of the day today  Pregnancy Complications:  Patient Active Problem List   Diagnosis    History of recurrent miscarriages    Intramural leiomyoma of uterus    Migraine    Multigravida in third trimester    41 weeks gestation of pregnancy       PMH:  Past Medical History:   Diagnosis Date    Asthma     seasonal resolved    Breast disorder     cyst benign    Intramural leiomyoma of uterus     Migraine     Seasonal allergies     Varicella     childhood       PSH:  Past Surgical History:   Procedure Laterality Date    NASAL SEPTUM SURGERY      TOOTH EXTRACTION         OB Hx:  Obstetric History       T1      L1     SAB2   TAB0   Ectopic0   Multiple0   Live Births1       # Outcome Date GA Lbr Sim/2nd Weight Sex Delivery Anes PTL Lv   4 Current            3 Term 14 39w0d  3175 g (7 lb) M Vag-Spont EPI  SIVA   2 SAB            1 SAB                   Meds:  No current facility-administered medications on file prior to encounter  Current Outpatient Prescriptions on File Prior to Encounter   Medication Sig Dispense Refill    Prenatal MV-Min-Fe Fum-FA-DHA (PRENATAL 1 PO) Take by mouth      butalbital-acetaminophen-caffeine (FIORICET,ESGIC) -40 mg per tablet Take 1 tablet by mouth every 4 (four) hours as needed for headaches         Allergies:   Allergies   Allergen Reactions    Other Allergic Rhinitis     environmental       Labs:  Blood type: O+  Antibody: negative  Group B strep: negative  HIV: negative  Hepatitis B: negative  RPR: Nonreactive  Rubella: Immune  Varicella Immune  1 hour Glucose: 94    Physical Exam:  /72 (BP Location: Left arm)   Pulse 75   Temp 98 4 °F (36 9 °C) (Oral)   Resp 18   Ht 5' 5" (1 651 m)   Wt 97 6 kg (215 lb 3 2 oz)   LMP 2017 (Exact Date)   Breastfeeding? Yes   BMI 35 81 kg/m²       Gen: AaOx3, NAD, pleasant  Pulm: No increased work of breathing  Abd: Gravid, nontender  Extremities: No edema, nontender, Negative Maritza's bilaterally    Estimated Fetal Weight: 8 lbs  Presentation: Vertex    SVE: 2-3 / 70% / -3  FHT:  125 / Moderate 6 - 25 bpm / +accels, -decels  [place]: q3-5m    Membranes: Intact    Assessment:   27 y o  [id number] at 41w1d weeks, here for late term induction of labor    Plan:   1  Admit to L&D  2  CBC, RPR, type and screen  3  Straight dose pitocin in triage overnight with plan for titration in AM  4  Epidural upon request    Discussed with Dr Koki Bourne, DO  OB/GYN, PGY2  3/1/2018, 11:38 PM

## 2018-03-02 NOTE — ANESTHESIA PREPROCEDURE EVALUATION
Review of Systems/Medical History  Patient summary reviewed  Chart reviewed      Cardiovascular   Pulmonary  Asthma: ,        GI/Hepatic            Endo/Other     GYN       Hematology   Musculoskeletal       Neurology    Headaches,    Psychology           Physical Exam    Airway    Mallampati score: II  TM Distance: >3 FB  Neck ROM: full     Dental   No notable dental hx     Cardiovascular  Rhythm: regular, Rate: normal, Cardiovascular exam normal    Pulmonary  Pulmonary exam normal Breath sounds clear to auscultation,     Other Findings        Anesthesia Plan  ASA Score- 2     Anesthesia Type- epidural with ASA Monitors  Additional Monitors:   Airway Plan:         Plan Factors-    Induction- intravenous  Postoperative Plan- Plan for postoperative opioid use  Informed Consent- Anesthetic plan and risks discussed with patient  I personally reviewed this patient with the CRNA  Discussed and agreed on the Anesthesia Plan with the CRNA  Erika Saavedra

## 2018-03-02 NOTE — OB LABOR/OXYTOCIN SAFETY PROGRESS
Oxytocin Safety Progress Check Note - Yessi Membreno 27 y o  female MRN: [id number]    Unit/Bed#: -01 Encounter: [id number]    Obstetric History       T1      L1     SAB2   TAB0   Ectopic0   Multiple0   Live Births1      Gestational Age: 38w3d  Dose (keisha-units/min) Oxytocin: 8 keisha-units/min  Contraction Frequency (minutes): 3-5  Contraction Quality: Strong  Tachysystole: No   Dilation: 4        Effacement (%): 70  Station: -2  Baseline Rate: 120 bpm  Fetal Heart Rate: 115 BPM  FHR Category: Category I          Notes/comments:     Patient is getting uncomfortable and desires epidural at this time  Has made cervical change to 4cm  Will get patient comfortable and then reassess              Guillermina Steward MD 3/2/2018 11:13 AM

## 2018-03-02 NOTE — DISCHARGE INSTRUCTIONS
Vaginal Delivery   WHAT YOU SHOULD KNOW:   A vaginal delivery is the birth of your baby through your vagina (birth canal)  AFTER YOU LEAVE:   Medicines:  · NSAIDs  help decrease swelling and pain or fever  This medicine is available with or without a doctor's order  NSAIDs can cause stomach bleeding or kidney problems in certain people  If you take blood thinner medicine, always ask your healthcare provider if NSAIDs are safe for you  Always read the medicine label and follow directions  · Take your medicine as directed  Call your healthcare provider if you think your medicine is not helping or if you have side effects  Tell him if you are allergic to any medicine  Keep a list of the medicines, vitamins, and herbs you take  Include the amounts, and when and why you take them  Bring the list or the pill bottles to follow-up visits  Carry your medicine list with you in case of an emergency  Follow up with your primary healthcare provider:  Most women need to return 6 weeks after a vaginal delivery  Ask about how to care for your wounds or stitches  Write down your questions so you remember to ask them during your visits  Activity:  Rest as much as possible  Try to keep all activities short  You may be able to do some exercise soon after you have your baby  Talk with your primary healthcare provider before you start exercising  If you work outside the home, ask when you can return to your job  Kegel exercises:  Kegel exercises may help your vaginal and rectal muscles heal faster  You can do Kegel exercises by tightening and relaxing the muscles around your vagina  Kegel exercises help make the muscles stronger  Breast care:  When your milk comes in, your breasts may feel full and hard  Ask how to care for your breasts, even if you are not breastfeeding  Constipation:  Do not try to push the bowel movement out if it is too hard   High-fiber foods, extra liquids, and regular exercise can help you prevent constipation  Examples of high-fiber foods are fruit and bran  Prune juice and water are good liquids to drink  Regular exercise helps your digestive system work  You may also be told to take over-the-counter fiber and stool softener medicines  Take these items as directed  Hemorrhoids:  Pregnancy can cause severe hemorrhoids  You may have rectal pain because of the hemorrhoids  Ask how to prevent or treat hemorrhoids  Perineum care: Your perineum is the area between your vagina and anus  Keep the area clean and dry to help it heal and to prevent infection  Wash the area gently with soap and water when you bathe or shower  Rinse your perineum with warm water when you use the toilet  Your primary healthcare provider may suggest you use a warm sitz bath to help decrease pain  A sitz bath is a bathtub or basin filled to hip level  Stay in the sitz bath for 20 to 30 minutes, or as directed  Vaginal discharge: You will have vaginal discharge, called lochia, after your delivery  The lochia is bright red the first day or two after the birth  By the fourth day, the amount decreases, and it turns red-brown  Use a sanitary pad rather than a tampon to prevent a vaginal infection  It is normal to have lochia up to 8 weeks after your baby is born  Monthly periods: Your period may start again within 7 to 12 weeks after your baby is born  If you are breastfeeding, it may take longer for your period to start again  You can still get pregnant again even though you do not have your monthly period  Talk with your primary healthcare provider about a birth control method that will be good for you if you do not want to get pregnant  Mood changes: Many new mothers have some kind of mood changes after delivery  Some of these changes occur because of lack of sleep, hormone changes, and caring for a new baby  Some mood changes can be more serious, such as postpartum depression   Talk with your primary healthcare provider if you feel unable to care for yourself or your baby  Sexual activity:  You may need to avoid sex for 6 to 7 weeks after you have your baby  You may notice you have a decreased desire for sex, or sex may be painful  You may need to use a vaginal lubricant (gel) to help make sex more comfortable  Contact your primary healthcare provider if:   · You have heavy vaginal bleeding that fills 1 or more sanitary pads in 1 hour  · You have a fever  · Your pain does not go away, or gets worse  · The skin between your vagina and rectum is swollen, warm, or red  · You have swollen, hard, or painful breasts  · You feel very sad or depressed  · You feel more tired than usual      · You have questions or concerns about your condition or care  Seek care immediately or call 911 if:   · You have pus or yellow drainage coming from your vagina or wound  · You are urinating very little, or not at all  · Your arm or leg feels warm, tender, and painful  It may look swollen and red  · You feel lightheaded, have sudden and worsening chest pain, or trouble breathing  You may have more pain when you take deep breaths or cough, or you may cough up blood  © 2014 [address] is for End User's use only and may not be sold, redistributed or otherwise used for commercial purposes  All illustrations and images included in CareNotes® are the copyrighted property of [employer]  or [employer]  The above information is an  only  It is not intended as medical advice for individual conditions or treatments  Talk to your doctor, nurse or pharmacist before following any medical regimen to see if it is safe and effective for you

## 2018-03-02 NOTE — OB LABOR/OXYTOCIN SAFETY PROGRESS
Oxytocin Safety Progress Check Note - Liz Maher 27 y o  female MRN: [id number]    Unit/Bed#: -01 Encounter: [id number]    Obstetric History       T1      L1     SAB2   TAB0   Ectopic0   Multiple0   Live Births1      Gestational Age: 38w3d  Dose (keisha-units/min) Oxytocin: 8 keisha-units/min  Contraction Frequency (minutes): 2-3  Contraction Quality: Moderate  Tachysystole: No   Dilation: Lip/rim (Comment)        Effacement (%): 100  Station: 0  Baseline Rate: 110 bpm  Fetal Heart Rate: 120 BPM  FHR Category: Category I          Notes/comments:     Making excellent change  Continue pitocin titration  CAT I tracing       D/w Dr Sade Bain MD 3/2/2018 3:49 PM

## 2018-03-03 RX ORDER — OXYCODONE HYDROCHLORIDE AND ACETAMINOPHEN 5; 325 MG/1; MG/1
2 TABLET ORAL EVERY 4 HOURS PRN
Status: DISCONTINUED | OUTPATIENT
Start: 2018-03-03 | End: 2018-03-04 | Stop reason: HOSPADM

## 2018-03-03 RX ORDER — OXYCODONE HYDROCHLORIDE AND ACETAMINOPHEN 5; 325 MG/1; MG/1
1 TABLET ORAL EVERY 4 HOURS PRN
Status: DISCONTINUED | OUTPATIENT
Start: 2018-03-03 | End: 2018-03-04 | Stop reason: HOSPADM

## 2018-03-03 RX ORDER — DIAPER,BRIEF,INFANT-TODD,DISP
1 EACH MISCELLANEOUS AS NEEDED
Status: DISCONTINUED | OUTPATIENT
Start: 2018-03-03 | End: 2018-03-04 | Stop reason: HOSPADM

## 2018-03-03 RX ORDER — DOCUSATE SODIUM 100 MG/1
100 CAPSULE, LIQUID FILLED ORAL 2 TIMES DAILY
Status: DISCONTINUED | OUTPATIENT
Start: 2018-03-03 | End: 2018-03-04 | Stop reason: HOSPADM

## 2018-03-03 RX ORDER — CALCIUM CARBONATE 200(500)MG
1000 TABLET,CHEWABLE ORAL DAILY PRN
Status: DISCONTINUED | OUTPATIENT
Start: 2018-03-03 | End: 2018-03-04 | Stop reason: HOSPADM

## 2018-03-03 RX ORDER — ACETAMINOPHEN 325 MG/1
650 TABLET ORAL EVERY 6 HOURS PRN
Status: DISCONTINUED | OUTPATIENT
Start: 2018-03-03 | End: 2018-03-04 | Stop reason: HOSPADM

## 2018-03-03 RX ORDER — DIPHENHYDRAMINE HCL 25 MG
25 TABLET ORAL EVERY 6 HOURS PRN
Status: DISCONTINUED | OUTPATIENT
Start: 2018-03-03 | End: 2018-03-04 | Stop reason: HOSPADM

## 2018-03-03 RX ORDER — OXYTOCIN/RINGER'S LACTATE 30/500 ML
250 PLASTIC BAG, INJECTION (ML) INTRAVENOUS
Status: ACTIVE | OUTPATIENT
Start: 2018-03-03 | End: 2018-03-03

## 2018-03-03 RX ORDER — ONDANSETRON 2 MG/ML
4 INJECTION INTRAMUSCULAR; INTRAVENOUS EVERY 8 HOURS PRN
Status: DISCONTINUED | OUTPATIENT
Start: 2018-03-03 | End: 2018-03-04 | Stop reason: HOSPADM

## 2018-03-03 RX ORDER — IBUPROFEN 600 MG/1
600 TABLET ORAL EVERY 6 HOURS PRN
Status: DISCONTINUED | OUTPATIENT
Start: 2018-03-03 | End: 2018-03-04 | Stop reason: HOSPADM

## 2018-03-03 RX ADMIN — WITCH HAZEL 1 PAD: 500 SOLUTION RECTAL; TOPICAL at 11:06

## 2018-03-03 RX ADMIN — OXYCODONE HYDROCHLORIDE AND ACETAMINOPHEN 1 TABLET: 5; 325 TABLET ORAL at 03:18

## 2018-03-03 RX ADMIN — IBUPROFEN 600 MG: 600 TABLET ORAL at 20:13

## 2018-03-03 RX ADMIN — BENZOCAINE AND MENTHOL: 20; .5 SPRAY TOPICAL at 11:06

## 2018-03-03 RX ADMIN — DOCUSATE SODIUM 100 MG: 100 CAPSULE, LIQUID FILLED ORAL at 11:06

## 2018-03-03 RX ADMIN — IBUPROFEN 600 MG: 600 TABLET ORAL at 06:39

## 2018-03-03 RX ADMIN — DOCUSATE SODIUM 100 MG: 100 CAPSULE, LIQUID FILLED ORAL at 18:09

## 2018-03-03 RX ADMIN — IBUPROFEN 600 MG: 600 TABLET ORAL at 13:13

## 2018-03-03 RX ADMIN — HYDROCORTISONE 1 APPLICATION: 1 CREAM TOPICAL at 11:06

## 2018-03-03 NOTE — PROGRESS NOTES
Progress Note - OB/GYN   Antonia Nip 27 y o  female MRN: [id number]  Unit/Bed#:  317-01 Encounter: [id number]    Assessment:  Post partum Day #1 s/p , stable, baby in room    Plan:    1) Continue routine post partum care   Encourage ambulation   Encourage breastfeeding   Anticipate discharge tomorrow     Subjective/Objective   Chief Complaint:     Post delivery  Patient is doing well  Lochia WNL  Pain well controlled  Subjective:     Pain: yes, cramping, improved with meds  Tolerating PO: yes  Voiding: yes  Flatus: yes  BM: no  Ambulating: yes  Breastfeeding:  yes  Chest pain: no  Shortness of breath: no  Leg pain: no  Lochia: minimal    Objective:     Vitals: /52 (BP Location: Right arm)   Pulse 76   Temp 97 8 °F (36 6 °C) (Oral)   Resp 20   Ht 5' 5" (1 651 m)   Wt 97 6 kg (215 lb 3 2 oz)   LMP 2017 (Exact Date)   SpO2 98%   Breastfeeding? Yes   BMI 35 81 kg/m²       Intake/Output Summary (Last 24 hours) at 18 0901  Last data filed at 18 0300   Gross per 24 hour   Intake                0 ml   Output             1230 ml   Net            -1230 ml       Lab Results   Component Value Date    WBC 11 60 (H) 2018    HGB 12 4 2018    HCT 35 8 2018    MCV 86 2018     2018       Physical Exam:     Gen: AAOx3, NAD  CV: RRR  Lungs: CTA b/l  Abd: Soft, non-tender, non-distended, no rebound or guarding  Uterine fundus firm and non-tender, at the umbilicus     Ext: Non tender    Jose Mcdonough MD  3/3/2018  9:01 AM

## 2018-03-03 NOTE — L&D DELIVERY NOTE
DELIVERY NOTE  Lawrence New 27 y o  female MRN: [id number]  Unit/Bed#: -01 Encounter: [id number]    Obstetrician:   Micki Asencio    Assistant: Mirella Wilcox    Pre-Delivery Diagnosis: IUP at 41w2d                          Post-Delivery Diagnosis: Same as above - Delivered or 2nd degree laceration             Viable female              Second degree perineal laceration    Procedure: Spontaneous vaginal delivery           Repair laceration    Indications for instrumental delivery: none    Estimated Blood Loss:  200     Anesthesia:  epidural           Complications:  None    Brief history/labor course:  Admitted for IOL  Less than 15 minute second stage  Description of Delivery: Patient delivered a viable Female  over intact perineum and/or 2nd degree laceration  A nuchal cord was no noted   With the assistance of maternal expulsive efforts and downward traction of fetal head, the anterior shoulder was delivered without difficulty, followed by the remainder of the infant's body  After delivery of the , delayed cord clamping was accomplished  The umbilical cord was doubly clamped and cut and the  was passed off to  staff for routine care  Umbilical cord blood and umbilical artery and venous gases were collected  Placenta was delivered with fundal massage and gentle traction on the cord with active management of the third stage of labor  Placenta delivered intact with a 3-vessel cord  Active management of the third stage of labor was undertaken with IV pitocin  Bleeding was noted to be under control  Inspection of the perineum, vagina, labia, and urethra [id number] degree which was then repaired in standard fashion with 3-0 Vicryl rapid  The lacerations showed good tissue reapproximation and hemostasis  Art pH:  7 286  BE:  -2 2   Alexandru pH:  7 367  BE:  -3 5    Mother and baby are currently recovering nicely in stable condition             Attending Attestation: I was present for the entire procedure

## 2018-03-03 NOTE — LACTATION NOTE
This note was copied from a baby's chart  Mom states infant is feeding well  C/O cluster feeding  Discussed normal  infant feeding patterns in the first few days and also signs of milk transfer  Given admission breastfeeding pkat  Discussed use of pacifier and early supplementation  Encouraged to call for assistance as needed

## 2018-03-03 NOTE — PLAN OF CARE
ALTERATION IN THE BREAST     Optimize infant feeding at the breast Progressing        DISCHARGE PLANNING     Discharge to home or other facility with appropriate resources Progressing        INADEQUATE LATCH, SUCK OR SWALLOW     Demonstrate ability to latch and sustain latch, audible swallowing and satiety Progressing        INFECTION - ADULT     Absence or prevention of progression during hospitalization Progressing     Absence of fever/infection during neutropenic period Progressing        Knowledge Deficit     Patient/family/caregiver demonstrates understanding of disease process, treatment plan, medications, and discharge instructions Progressing        PAIN - ADULT     Verbalizes/displays adequate comfort level or baseline comfort level Progressing        SAFETY ADULT     Patient will remain free of falls Progressing     Maintain or return to baseline ADL function Progressing     Maintain or return mobility status to optimal level Progressing

## 2018-03-04 VITALS
HEIGHT: 65 IN | BODY MASS INDEX: 35.85 KG/M2 | WEIGHT: 215.2 LBS | RESPIRATION RATE: 20 BRPM | HEART RATE: 66 BPM | OXYGEN SATURATION: 97 % | SYSTOLIC BLOOD PRESSURE: 100 MMHG | TEMPERATURE: 98.1 F | DIASTOLIC BLOOD PRESSURE: 58 MMHG

## 2018-03-04 RX ORDER — IBUPROFEN 600 MG/1
600 TABLET ORAL EVERY 6 HOURS PRN
Qty: 30 TABLET | Refills: 0 | Status: SHIPPED | OUTPATIENT
Start: 2018-03-04 | End: 2018-03-04 | Stop reason: HOSPADM

## 2018-03-04 RX ORDER — ACETAMINOPHEN 325 MG/1
TABLET ORAL
Qty: 30 TABLET | Refills: 0 | Status: SHIPPED | OUTPATIENT
Start: 2018-03-04 | End: 2018-03-04 | Stop reason: HOSPADM

## 2018-03-04 RX ORDER — BUTALBITAL, ACETAMINOPHEN AND CAFFEINE 50; 325; 40 MG/1; MG/1; MG/1
1 TABLET ORAL ONCE
Status: COMPLETED | OUTPATIENT
Start: 2018-03-04 | End: 2018-03-04

## 2018-03-04 RX ORDER — DOCUSATE SODIUM 100 MG/1
100 CAPSULE, LIQUID FILLED ORAL 2 TIMES DAILY
Qty: 10 CAPSULE | Refills: 0 | Status: SHIPPED | OUTPATIENT
Start: 2018-03-04 | End: 2018-03-04 | Stop reason: HOSPADM

## 2018-03-04 RX ADMIN — BUTALBITAL, ACETAMINOPHEN, AND CAFFEINE 1 TABLET: 50; 325; 40 TABLET ORAL at 08:04

## 2018-03-04 RX ADMIN — IBUPROFEN 600 MG: 600 TABLET ORAL at 04:06

## 2018-03-04 RX ADMIN — DOCUSATE SODIUM 100 MG: 100 CAPSULE, LIQUID FILLED ORAL at 08:05

## 2018-03-04 NOTE — LACTATION NOTE
This note was copied from a baby's chart  Mom C/O painful nipples  Left nipple reddened  Right nipple intact but sore  Reviewed correct latch and positioning and sore nipple care  Infant assisted to breast in cradle hold  Demonstrated techniques for a closer latch  Given breast shells and gel pads with instructions  Given breastfeeding discharge pkat and hiral of feeding log reviewed  Discussed engorgement relief measures and where to call for additional assistance as needed

## 2018-03-04 NOTE — PLAN OF CARE
ALTERATION IN THE BREAST     Optimize infant feeding at the breast Completed        DISCHARGE PLANNING     Discharge to home or other facility with appropriate resources Completed        INADEQUATE LATCH, SUCK OR SWALLOW     Demonstrate ability to latch and sustain latch, audible swallowing and satiety Completed        INFECTION - ADULT     Absence or prevention of progression during hospitalization Completed     Absence of fever/infection during neutropenic period Completed        Knowledge Deficit     Patient/family/caregiver demonstrates understanding of disease process, treatment plan, medications, and discharge instructions Completed        PAIN - ADULT     Verbalizes/displays adequate comfort level or baseline comfort level Completed        SAFETY ADULT     Patient will remain free of falls Completed     Maintain or return to baseline ADL function Completed     Maintain or return mobility status to optimal level Completed

## 2018-03-04 NOTE — PROGRESS NOTES
Progress Note - OB/GYN   Edwardo Nicholson 27 y o  female MRN: [id number]  Unit/Bed#: -01 Encounter: [id number]    Assessment:  Post partum Day #2 s/p , stable, baby in room    Plan:    1) Continue routine post partum care   Encourage ambulation   Encourage breastfeeding   Anticipate discharge today     Subjective/Objective   Chief Complaint:     Post delivery  Patient is doing well  Lochia WNL  Pain well controlled  Subjective:     Pain: yes, cramping, improved with meds  Tolerating PO: yes  Voiding: yes  Flatus: yes  BM: no  Ambulating: yes  Breastfeeding:  yes  Chest pain: no  Shortness of breath: no  Leg pain: no  Lochia: minimal    Objective:     Vitals: /60   Pulse 69   Temp 97 9 °F (36 6 °C) (Oral)   Resp 18   Ht 5' 5" (1 651 m)   Wt 97 6 kg (215 lb 3 2 oz)   LMP 2017 (Exact Date)   SpO2 97%   Breastfeeding? Yes   BMI 35 81 kg/m²     No intake or output data in the 24 hours ending 18 0801    Lab Results   Component Value Date    WBC 11 60 (H) 2018    HGB 12 4 2018    HCT 35 8 2018    MCV 86 2018     2018       Physical Exam:     Gen: AAOx3, NAD  CV: RRR  Lungs: CTA b/l  Abd: Soft, non-tender, non-distended, no rebound or guarding  Uterine fundus firm and non-tender, 2 cm below the umbilicus     Ext: Non tender    Jeaneen Bumpers, MD  3/4/2018  8:01 AM

## 2018-03-04 NOTE — SOCIAL WORK
CM consult for breast pump  Patient requesting Medela pump  Referral placed in [facility]  CM delivered to patients room 3/4/18  No other CM needs identified

## 2018-03-06 NOTE — CASE MANAGEMENT
Notification of Maternity Inpatient Admission/Maternity Inpatient Authorization Request  This is a Notification of Maternity Inpatient Admission/Maternity Inpatient Authorization Request to our facility [facility]  Please be advised that this patient is currently in our facility under Inpatient Status  Below you will find the Birth/ Summary, Attending Physician and Facilitys information including NPI# and contact for the Utilization  assigned to the [facility] where the patient is receiving services  Please feel free to contact the Utilization Review Department with any questions  Mothers Information:  Radha Room  MRN: [id number]  YOB: 1985  Admission Date: 3/1/2018  7:58 PM  Discharge Date: 3/4/2018 12:55 PM  Disposition: Home/Self Care  Admitting Diagnosis: Encounter for full-term uncomplicated delivery [P87]  41 weeks gestation of pregnancy [Z3A 41]   Information:  Estimated Date of Delivery: 18  Information for the patient's :  Alva Felix [[id number]]      Delivery Information:  Sex: female  Delivered 3/2/2018 7:46 PM by Vaginal, Spontaneous Delivery; Gestational Age: 38w3d    Corinth Measurements:  Weight: 8 lb 14 2 oz (4031 g); Height: 20"    APGAR 1 minute 5 minutes 10 minutes   Totals: 9 9      OB History      Para Term  AB Living    4 2 2   2 2    SAB TAB Ectopic Multiple Live Births    2     0 2        Attending Physician:  OXANA Obrien    Specialty- Obstetrics and Gynecology  [facility] ID- [id number]  [address] [place] [address]  Phone 1: [phone]  Fax: [phone]    [facility] [address]  [phone]  Tax ID: [id number]  NPI: [id number]    [address] [place] [address] in the [facility] by [place] clay 2017  Network Utilization Review Department  Phone: [phone]; Fax [phone]  ATTENTION: The Network Utilization Review Department is now centralized for our 7 Facilities  Make a note that we have a new phone and fax numbers for our Department  Please call with any questions or concerns to [phone] and carefully follow the prompts so that you are directed to the right person  All voicemails are confidential  Fax any determinations, approvals, denials, and requests for initial or continue stay review clinical to [phone]  Due to HIGH CALL volume, it would be easier if you could please send faxed requests to expedite your requests and in part, help us provide discharge notifications faster

## 2018-03-07 NOTE — PROGRESS NOTES
Education  Baby & Me Education 1st Trimester:   First Trimester Education provided:   benefits of breastfeeding, importance of exclusive breastfeeding, early initiation of breastfeeding and exclusive breastfeeding for the first 6 months   The patient is planning on breastfeeding     Prenatal education provided by: Magda Moore   Electronically signed by : Severa Christen, ; Aug  4 2017  1:45PM EST                       (Author)

## 2018-03-16 LAB — PLACENTA IN STORAGE: NORMAL

## 2018-04-06 ENCOUNTER — POSTPARTUM VISIT (OUTPATIENT)
Dept: OBGYN CLINIC | Facility: MEDICAL CENTER | Age: 33
End: 2018-04-06

## 2018-04-06 VITALS — WEIGHT: 197 LBS | DIASTOLIC BLOOD PRESSURE: 74 MMHG | SYSTOLIC BLOOD PRESSURE: 124 MMHG | BODY MASS INDEX: 32.78 KG/M2

## 2018-04-06 DIAGNOSIS — Z30.011 ENCOUNTER FOR INITIAL PRESCRIPTION OF CONTRACEPTIVE PILLS: ICD-10-CM

## 2018-04-06 PROBLEM — O48.0 41 WEEKS GESTATION OF PREGNANCY: Status: RESOLVED | Noted: 2018-03-01 | Resolved: 2018-04-06

## 2018-04-06 PROBLEM — N96 HISTORY OF RECURRENT MISCARRIAGES: Status: RESOLVED | Noted: 2017-02-08 | Resolved: 2018-04-06

## 2018-04-06 PROBLEM — Z3A.41 41 WEEKS GESTATION OF PREGNANCY: Status: RESOLVED | Noted: 2018-03-01 | Resolved: 2018-04-06

## 2018-04-06 PROBLEM — Z34.83 MULTIGRAVIDA IN THIRD TRIMESTER: Status: RESOLVED | Noted: 2018-02-02 | Resolved: 2018-04-06

## 2018-04-06 PROCEDURE — 99024 POSTOP FOLLOW-UP VISIT: CPT | Performed by: OBSTETRICS & GYNECOLOGY

## 2018-04-06 RX ORDER — NORETHINDRONE ACETATE AND ETHINYL ESTRADIOL AND FERROUS FUMARATE 1MG-20(24)
1 KIT ORAL DAILY
Qty: 28 TABLET | Refills: 5 | Status: SHIPPED | OUTPATIENT
Start: 2018-04-06 | End: 2018-07-05 | Stop reason: ALTCHOICE

## 2018-04-06 NOTE — PROGRESS NOTES
Assessment/Plan:      Diagnoses and all orders for this visit:    Encounter for initial prescription of contraceptive pills  -     norethindrone-ethinyl estradiol-ferrous fumarate (LOESTIN 24 FE) 1-20 MG-MCG(24) per tablet; Take 1 tablet by mouth daily    Postpartum care and examination        Patient will resume normal activity  We discussed start of OCP until patient has decided upon the Mirena IUD  [facility] as well as the insurance checklist   She will call if she desires to proceed  Patient to return in 6 months for annual visit  She will utilize condoms until she has completed 1 full pack of OCPs  Subjective:     Patient ID: Leopoldo Clements is a 27 y o  female  Patient returns status post spontaneous vaginal delivery on 03/08/2018  She had a second-degree perineal laceration which is fully healed  She has no abnormal bleeding  She is breast-feeding without difficulty  She is interested in restarting OCPs until she investigate the Mirena IUD further  Review of Systems   All other systems reviewed and are negative  Objective:     Physical Exam   Constitutional: She appears well-developed and well-nourished  Pulmonary/Chest: Effort normal    Abdominal: Soft  There is no tenderness  Genitourinary:   Genitourinary Comments: External genitalia normal female and well-healed    Vagina healthy without lesions  Cervix nontender without lesions  Uterus normal size nontender  Adnexa nontender without mass

## 2018-06-14 ENCOUNTER — TELEPHONE (OUTPATIENT)
Dept: OBGYN CLINIC | Facility: CLINIC | Age: 33
End: 2018-06-14

## 2018-06-14 NOTE — TELEPHONE ENCOUNTER
[facility] received [facility]  Will be placed in [facility] room, pt can be scheduled for insertion  Thank you!

## 2018-06-18 NOTE — TELEPHONE ENCOUNTER
Patient scheduled for 7/3/18 @ 2:00  She is on progesterone only pill due to breastfeeding  She is getting periods  She will have her period the week of fourth of July  Will medicate 1 hour prior  Booked with ED

## 2018-07-05 ENCOUNTER — OFFICE VISIT (OUTPATIENT)
Dept: OBGYN CLINIC | Facility: CLINIC | Age: 33
End: 2018-07-05
Payer: COMMERCIAL

## 2018-07-05 VITALS — WEIGHT: 187 LBS | BODY MASS INDEX: 31.12 KG/M2 | SYSTOLIC BLOOD PRESSURE: 110 MMHG | DIASTOLIC BLOOD PRESSURE: 70 MMHG

## 2018-07-05 DIAGNOSIS — Z30.430 ENCOUNTER FOR IUD INSERTION: Primary | ICD-10-CM

## 2018-07-05 PROCEDURE — 58300 INSERT INTRAUTERINE DEVICE: CPT | Performed by: OBSTETRICS & GYNECOLOGY

## 2018-07-05 NOTE — PROGRESS NOTES
Iud insertions  Date/Time: 7/5/2018 1:32 PM  Performed by: Mela Mohr by: Arsalan Layne     Consent:     Consent obtained:  Written    Consent given by:  Patient    Procedure risks and benefits discussed: yes      Patient questions answered: yes      Patient agrees, verbalizes understanding, and wants to proceed: yes    Procedure:     Cervix cleaned and prepped: yes      Speculum placed in vagina: yes      Tenaculum applied to cervix: yes      Uterus sounded: yes      IUD inserted with no complications: yes      IUD type:  Mirena    Strings trimmed: yes      Uterus sound depth (cm):  8  Post-procedure:     Patient tolerated procedure well: yes      Patient will follow up after next period: yes    Comments:      Patient on continuous BCP

## 2018-08-16 ENCOUNTER — OFFICE VISIT (OUTPATIENT)
Dept: OBGYN CLINIC | Facility: CLINIC | Age: 33
End: 2018-08-16
Payer: COMMERCIAL

## 2018-08-16 VITALS — SYSTOLIC BLOOD PRESSURE: 110 MMHG | BODY MASS INDEX: 30.95 KG/M2 | DIASTOLIC BLOOD PRESSURE: 76 MMHG | WEIGHT: 186 LBS

## 2018-08-16 DIAGNOSIS — Z30.431 IUD CHECK UP: Primary | ICD-10-CM

## 2018-08-16 PROBLEM — Z30.011 ENCOUNTER FOR INITIAL PRESCRIPTION OF CONTRACEPTIVE PILLS: Status: RESOLVED | Noted: 2018-04-06 | Resolved: 2018-08-16

## 2018-08-16 PROCEDURE — 99212 OFFICE O/P EST SF 10 MIN: CPT | Performed by: OBSTETRICS & GYNECOLOGY

## 2018-08-16 NOTE — PROGRESS NOTES
Carola LIA Higgins  1985      CC: IUD check    S: 26 y o  female here for IUD check  She is status post placement of a Mirena IUD on 7/5/2018  She has had mild irregular bleeding since placement but nothing bothersome to her  She has had no pain or other side effects  Her partner has felt the strings  No LMP recorded  Current Outpatient Prescriptions:     butalbital-acetaminophen-caffeine (FIORICET,ESGIC) -40 mg per tablet, Take 1 tablet by mouth every 4 (four) hours as needed for headaches, Disp: , Rfl:   Social History     Social History    Marital status: /Civil Union     Spouse name: N/A    Number of children: N/A    Years of education: N/A     Occupational History    Not on file  Social History Main Topics    Smoking status: Never Smoker    Smokeless tobacco: Never Used    Alcohol use No    Drug use: No    Sexual activity: Not on file     Other Topics Concern    Not on file     Social History Narrative    Caffeine use    Uses safety equipment, seat belts     Family History   Problem Relation Age of Onset    Hyperlipidemia Mother     Hypertension Mother     Lung cancer Father     Cancer Father     Heart disease Maternal Grandmother     Hypertension Maternal Grandmother     Osteoporosis Maternal Grandmother     Heart disease Maternal Grandfather     Heart attack Paternal Grandfather     Heart disease Paternal Grandfather     Other Maternal Aunt         breast disorders    Alcohol abuse Paternal Uncle     Pancreatic cancer Paternal Uncle     Cancer Paternal Uncle     Breast cancer Family      Past Medical History:   Diagnosis Date    Asthma     seasonal resolved    Breast disorder     cyst benign    Intramural leiomyoma of uterus     Migraine     Seasonal allergies     Varicella     childhood       O:  Blood pressure 110/76, weight 84 4 kg (186 lb), currently breastfeeding      Abdomen is soft and nontender  External genitals are normal without rashes or lesions  Vagina is normal without discharge or bleeding  Cervix is normal without discharge or lesion  IUD strings are normal  Strings trimmed  A:  IUD in place    P:  Patient was reassured that irregular bleeding is common for the first three-six months of IUD use and that this should slowly resolve over time  She will call with any persistently abnormal bleeding or other problems  She will return for her yearly exam as scheduled

## 2018-10-18 ENCOUNTER — ANNUAL EXAM (OUTPATIENT)
Dept: OBGYN CLINIC | Facility: CLINIC | Age: 33
End: 2018-10-18
Payer: COMMERCIAL

## 2018-10-18 VITALS
SYSTOLIC BLOOD PRESSURE: 120 MMHG | HEIGHT: 65 IN | BODY MASS INDEX: 31.49 KG/M2 | WEIGHT: 189 LBS | DIASTOLIC BLOOD PRESSURE: 72 MMHG

## 2018-10-18 DIAGNOSIS — Z01.419 ENCNTR FOR GYN EXAM (GENERAL) (ROUTINE) W/O ABN FINDINGS: Primary | ICD-10-CM

## 2018-10-18 DIAGNOSIS — K64.9 HEMORRHOIDS, UNSPECIFIED HEMORRHOID TYPE: ICD-10-CM

## 2018-10-18 DIAGNOSIS — Z11.51 SCREENING FOR HUMAN PAPILLOMAVIRUS (HPV): ICD-10-CM

## 2018-10-18 PROCEDURE — 99395 PREV VISIT EST AGE 18-39: CPT | Performed by: OBSTETRICS & GYNECOLOGY

## 2018-10-18 PROCEDURE — G0145 SCR C/V CYTO,THINLAYER,RESCR: HCPCS | Performed by: OBSTETRICS & GYNECOLOGY

## 2018-10-18 PROCEDURE — 87624 HPV HI-RISK TYP POOLED RSLT: CPT | Performed by: OBSTETRICS & GYNECOLOGY

## 2018-10-18 NOTE — PROGRESS NOTES
Carola Fraser Gess  1985      CC:  Yearly exam    S:  26 y o  female here for yearly exam  Her cycles are regular, not heavy or crampy with some spotting between from [facility]  She is sexually active  She uses Mirena for contraception  She has been having recurrent bleeding from hemorrhoids since delivery  Discussed increased fiber or stool softener  If this is not effective, would see colorectal surgeon  Last Pap 8/5/2013 - normal/negative HPV  Last Mammo - never      Current Outpatient Prescriptions:     butalbital-acetaminophen-caffeine (FIORICET,ESGIC) -40 mg per tablet, Take 1 tablet by mouth every 4 (four) hours as needed for headaches, Disp: , Rfl:   Social History     Social History    Marital status: /Civil Union     Spouse name: N/A    Number of children: N/A    Years of education: N/A     Occupational History    Not on file       Social History Main Topics    Smoking status: Never Smoker    Smokeless tobacco: Never Used    Alcohol use Not on file    Drug use: No    Sexual activity: Yes     Partners: Male     Birth control/ protection: IUD      Comment:      Other Topics Concern    Not on file     Social History Narrative    Caffeine use    Uses safety equipment, seat belts     Family History   Problem Relation Age of Onset    Hyperlipidemia Mother     Hypertension Mother     Lung cancer Father     Cancer Father     Heart disease Maternal Grandmother     Hypertension Maternal Grandmother     Osteoporosis Maternal Grandmother     Heart disease Maternal Grandfather     Heart attack Paternal Grandfather     Heart disease Paternal Grandfather     Other Maternal Aunt         breast disorders    Alcohol abuse Paternal Uncle     Pancreatic cancer Paternal Uncle     Cancer Paternal Uncle     Breast cancer Family     No Known Problems Sister     No Known Problems Brother       Past Medical History:   Diagnosis Date    Asthma     seasonal resolved    Breast disorder     cyst benign    Intramural leiomyoma of uterus     Migraine     Seasonal allergies     Varicella     childhood        O:  Blood pressure 120/72, height 5' 5" (1 651 m), weight 85 7 kg (189 lb), currently breastfeeding  Patient appears well and is not in distress  Neck is supple without masses  Breasts are symmetrical without mass, tenderness, nipple discharge, skin changes or adenopathy  Abdomen is soft and nontender without masses  External genitals are normal without lesions or rashes  Vagina is normal without discharge or bleeding  Cervix is normal without discharge or lesion  IUD strings seen  Uterus is normal, mobile, nontender without palpable mass  Adnexa are normal, nontender, without palpable mass  A:  Yearly exam      P:   Pap with HPV done - will call with results  Referral for colorectal surgeon given prn  RTO one year for yearly exam or sooner as needed

## 2018-10-22 LAB
HPV HR 12 DNA CVX QL NAA+PROBE: NEGATIVE
HPV16 DNA CVX QL NAA+PROBE: NEGATIVE
HPV18 DNA CVX QL NAA+PROBE: NEGATIVE

## 2018-10-23 LAB
LAB AP GYN PRIMARY INTERPRETATION: NORMAL
Lab: NORMAL

## 2019-01-21 ENCOUNTER — HOSPITAL ENCOUNTER (OUTPATIENT)
Dept: RADIOLOGY | Facility: HOSPITAL | Age: 34
Discharge: HOME | End: 2019-01-21
Payer: COMMERCIAL

## 2019-01-21 ENCOUNTER — TRANSCRIBE ORDERS (OUTPATIENT)
Dept: ADMINISTRATIVE | Facility: HOSPITAL | Age: 34
End: 2019-01-21

## 2019-01-21 DIAGNOSIS — J20.9 ACUTE BRONCHITIS, UNSPECIFIED ORGANISM: Primary | ICD-10-CM

## 2019-01-21 DIAGNOSIS — J20.9 ACUTE BRONCHITIS, UNSPECIFIED ORGANISM: ICD-10-CM

## 2019-01-21 PROCEDURE — 71046 X-RAY EXAM CHEST 2 VIEWS: CPT

## 2019-01-31 ENCOUNTER — OFFICE VISIT (OUTPATIENT)
Dept: PULMONOLOGY | Facility: CLINIC | Age: 34
End: 2019-01-31
Payer: COMMERCIAL

## 2019-01-31 VITALS
BODY MASS INDEX: 28.61 KG/M2 | WEIGHT: 178 LBS | HEART RATE: 84 BPM | RESPIRATION RATE: 14 BRPM | TEMPERATURE: 98.7 F | DIASTOLIC BLOOD PRESSURE: 60 MMHG | HEIGHT: 66 IN | OXYGEN SATURATION: 97 % | SYSTOLIC BLOOD PRESSURE: 110 MMHG

## 2019-01-31 DIAGNOSIS — J45.990 EXERCISE INDUCED BRONCHOSPASM: Primary | ICD-10-CM

## 2019-01-31 DIAGNOSIS — J12.9 VIRAL PNEUMONIA: ICD-10-CM

## 2019-01-31 PROBLEM — J30.2 SEASONAL ALLERGIES: Status: ACTIVE | Noted: 2019-01-31

## 2019-01-31 PROCEDURE — 99244 OFF/OP CNSLTJ NEW/EST MOD 40: CPT | Performed by: INTERNAL MEDICINE

## 2019-01-31 PROCEDURE — 94060 EVALUATION OF WHEEZING: CPT | Performed by: INTERNAL MEDICINE

## 2019-01-31 RX ORDER — ALBUTEROL SULFATE 90 UG/1
2 AEROSOL, METERED RESPIRATORY (INHALATION) EVERY 6 HOURS PRN
Qty: 18 G | Refills: 0
Start: 2019-01-31 | End: 2019-07-18 | Stop reason: SDUPTHER

## 2019-01-31 NOTE — LETTER
February 1, 2019     Morris Speedy [address]  [address] [place] [address]    Patient: Nancy Bliss   YOB: 1985   Date of Visit: 1/31/2019       Dear Dr Brisa Ramirez: Thank you for referring Rajani Riddle to me for evaluation  Below are my notes for this consultation  If you have questions, please do not hesitate to call me  I look forward to following your patient along with you  Sincerely,        Hiro White DO        CC: No Recipients  Hiro White DO  2/1/2019  6:07 PM  Sign at close encounter  Pulmonary Consultation   Nancy Bliss 26 y o  female MRN: [id number]      Reason for consultation:     Requesting physician: Dr Brisa Ramirez    Assessment/Plan  25 y/o F with PMHx of migraine HA and seasonal allergies who comes in for evaluation of recurrent bronchitis  1   Abnormal CXR -  L lower lobe opacity suspicious for pneumonia  I believe this to be viral pneumonia that is just starting to resolve at this point  Influenza was negative  She completed course of zithromax and is completing a course of Doxycycline at this point with little clinical improvement       -  Repeat CXR PA and lateral to reevaluate infiltrate  -  If there is no clinical improvement, will need to consider bronchoscopy to ensure that she does not have a resistant pneumonia  -  May need to consider Levaquin to see if it is more successful      -  Since she still has productive cough and wheezing at night, will trial Stiolto in place of ICS to see if she gets any benefit from that inhaler  2   Exercise induced bronchospasm/Productive cough -  Spirometry was normal   She may still have some component of allergic bronchitis  She had a reaction to ICS  Will trial Stiolto to see if there is any clinical improvement as above           -  Will consider allergy evaluation at next visit if cough and wheezing persists       -  I recommend she trial nasal steroid for 2 weeks to see if she gets any benefit from that  History of Present Illness   HPI:  Krysta Martin is a 26 y o  female with PMHx as below who comes in for evaluation for persistent cough and bronchitis  She had suffered with URI that started January 1st   At that time she had fevers, myalgias, fatigue, sore throat, cough and diarrhea  She was swabbed for influenza which was negative and was treated for bronchitis with prednisone, zithromax and Flovent inhaler  She had a rash associated with flovent so she stopped using it  She did not note significant improvement with that  She continued to cough, have nocturnal wheezing and chills  When zithromax was not helpful she was placed on doxycycline and has completed 7 days of that  She does not feel there was a significant improvement with that however  She has been noting some dyspnea with exertion and worsening of breathing when exposed to cold air  She has not been able to work out due to dyspnea  She has also noted pain along the L side of her chest   She denies fever, night sweats, swelling in the legs, or myalgias currently  ROS:   Review of Systems   Constitutional: Positive for chills, fatigue and fever  HENT: Negative  Eyes: Negative  Respiratory: Negative  Cardiovascular: Positive for chest pain  Gastrointestinal: Negative  Endocrine: Negative  Genitourinary: Negative  Musculoskeletal: Negative  Skin: Positive for rash  Allergic/Immunologic: Negative  Neurological: Positive for weakness and headaches  Negative for dizziness and speech difficulty  Hematological: Negative for adenopathy  Does not bruise/bleed easily  Psychiatric/Behavioral: Negative          Historical Information   Past Medical History:   Diagnosis Date    Asthma     seasonal resolved    Breast disorder     cyst benign    Intramural leiomyoma of uterus     Migraine     Seasonal allergies     Varicella     childhood     Past Surgical History:   Procedure Laterality Date    NASAL SEPTUM SURGERY      TOOTH EXTRACTION       Family History   Problem Relation Age of Onset    Hyperlipidemia Mother     Hypertension Mother     Lung cancer Father     Cancer Father     Heart disease Maternal Grandmother     Hypertension Maternal Grandmother     Osteoporosis Maternal Grandmother     Heart disease Maternal Grandfather     Heart attack Paternal Grandfather     Heart disease Paternal Grandfather     Other Maternal Aunt         breast disorders    Alcohol abuse Paternal Uncle     Pancreatic cancer Paternal Uncle     Cancer Paternal Uncle     Breast cancer Family     No Known Problems Sister     No Known Problems Brother      Social History     Social History    Marital status: /Civil Union     Spouse name: N/A    Number of children: N/A    Years of education: N/A     Occupational History    Not on file  Social History Main Topics    Smoking status: Never Smoker    Smokeless tobacco: Never Used    Alcohol use Not on file    Drug use: No    Sexual activity: Yes     Partners: Male     Birth control/ protection: IUD      Comment:      Other Topics Concern    Not on file     Social History Narrative    Caffeine use    Uses safety equipment, seat belts       Occupational History: Human Resources at office    Meds/Allergies   Allergies   Allergen Reactions    Other Allergic Rhinitis     environmental    Pollen Extract Other (See Comments)     Sinusitis       Home medications:  Prior to Admission medications    Medication Sig Start Date End Date Taking?  Authorizing Provider   butalbital-acetaminophen-caffeine (FIORICET,ESGIC) -40 mg per tablet Take 1 tablet by mouth every 4 (four) hours as needed for headaches   Yes Historical Provider, MD   albuterol (VENTOLIN HFA) 90 mcg/act inhaler Inhale 2 puffs every 6 (six) hours as needed for wheezing 1/31/19   Nikunj No MD       Vitals:   Blood pressure 110/60, pulse 84, temperature 98 7 °F (37 1 °C), resp  rate 14, height 5' 6" (1 676 m), weight 80 7 kg (178 lb), SpO2 97 %, currently breastfeeding , RA, Body mass index is 28 73 kg/m²  Physical Exam  General: Pleasant, Awake alert and oriented x 3, conversant without conversational dyspnea, NAD, normal affect  HEENT:  PERRL, Sclera noninjected, nonicteric OU, Nares patent,  no craniofacial abnormalities, Mucous membranes, moist, no oral lesions, normal dentition  NECK: Trachea midline, no accessory muscle use, no stridor, no cervical or supraclavicular adenopathy, JVP not elevated  CARDIAC: Reg, single s1/S2, no m/r/g  PULM: CTA bilaterally no wheezing, rhonchi or rales  ABD: Normoactive bowel sounds, soft nontender, nondistended, no rebound, no rigidity, no guarding  EXT: No cyanosis, no clubbing, no edema, normal capillary refill  NEURO: no focal neurologic deficits, AAOx3, moving all extremities appropriately    Labs: I have personally reviewed pertinent lab results  Lab Results   Component Value Date    WBC 11 60 (H) 03/01/2018    HGB 12 4 03/01/2018    HCT 35 8 03/01/2018    MCV 86 03/01/2018     03/01/2018      Lab Results   Component Value Date    GLUCOSE 92 11/15/2016    CO2 25 11/15/2016       PFTs:  The most recent pulmonary function tests were reviewed  Fredick Boas in office today  Prebronchodilator  FVC - 3 53 (88%)  FEV1 - 2 98 (90%)  FEV1/FVC - 84%    Post bronchodilator  FVC - 3 33 (83%)  FEV1 - 2 96 (90%)  FEV1/FVC -  89%  Normal spirometry     Imaging  I personally reviewed the images on the [facility] system pertinent to today's visit  CXR PA and lateral - IMPRESSION:  Patchy left lower lobe airspace opacity suspicious for pneumonia  The study was marked in [facility] for immediate notification                                         DO Rosa Yip 71 Sleep Physician

## 2019-02-01 NOTE — PROGRESS NOTES
Pulmonary Consultation   Cassie Rees 26 y o  female MRN: [id number]      Reason for consultation:     Requesting physician: Dr Albert Law    Assessment/Plan  27 y/o F with PMHx of migraine HA and seasonal allergies who comes in for evaluation of recurrent bronchitis  1   Abnormal CXR -  L lower lobe opacity suspicious for pneumonia  I believe this to be viral pneumonia that is just starting to resolve at this point  Influenza was negative  She completed course of zithromax and is completing a course of Doxycycline at this point with little clinical improvement       -  Repeat CXR PA and lateral to reevaluate infiltrate  -  If there is no clinical improvement, will need to consider bronchoscopy to ensure that she does not have a resistant pneumonia  -  May need to consider Levaquin to see if it is more successful      -  Since she still has productive cough and wheezing at night, will trial Stiolto in place of ICS to see if she gets any benefit from that inhaler  2   Exercise induced bronchospasm/Productive cough -  Spirometry was normal   She may still have some component of allergic bronchitis  She had a reaction to ICS  Will trial Stiolto to see if there is any clinical improvement as above  -  Will consider allergy evaluation at next visit if cough and wheezing persists       -  I recommend she trial nasal steroid for 2 weeks to see if she gets any benefit from that  History of Present Illness   HPI:  Cassie Rees is a 26 y o  female with PMHx as below who comes in for evaluation for persistent cough and bronchitis  She had suffered with URI that started January 1st   At that time she had fevers, myalgias, fatigue, sore throat, cough and diarrhea  She was swabbed for influenza which was negative and was treated for bronchitis with prednisone, zithromax and Flovent inhaler  She had a rash associated with flovent so she stopped using it     She did not note significant improvement with that  She continued to cough, have nocturnal wheezing and chills  When zithromax was not helpful she was placed on doxycycline and has completed 7 days of that  She does not feel there was a significant improvement with that however  She has been noting some dyspnea with exertion and worsening of breathing when exposed to cold air  She has not been able to work out due to dyspnea  She has also noted pain along the L side of her chest   She denies fever, night sweats, swelling in the legs, or myalgias currently  ROS:   Review of Systems   Constitutional: Positive for chills, fatigue and fever  HENT: Negative  Eyes: Negative  Respiratory: Negative  Cardiovascular: Positive for chest pain  Gastrointestinal: Negative  Endocrine: Negative  Genitourinary: Negative  Musculoskeletal: Negative  Skin: Positive for rash  Allergic/Immunologic: Negative  Neurological: Positive for weakness and headaches  Negative for dizziness and speech difficulty  Hematological: Negative for adenopathy  Does not bruise/bleed easily  Psychiatric/Behavioral: Negative          Historical Information   Past Medical History:   Diagnosis Date    Asthma     seasonal resolved    Breast disorder     cyst benign    Intramural leiomyoma of uterus     Migraine     Seasonal allergies     Varicella     childhood     Past Surgical History:   Procedure Laterality Date    NASAL SEPTUM SURGERY      TOOTH EXTRACTION       Family History   Problem Relation Age of Onset    Hyperlipidemia Mother     Hypertension Mother     Lung cancer Father     Cancer Father     Heart disease Maternal Grandmother     Hypertension Maternal Grandmother     Osteoporosis Maternal Grandmother     Heart disease Maternal Grandfather     Heart attack Paternal Grandfather     Heart disease Paternal Grandfather     Other Maternal Aunt         breast disorders    Alcohol abuse Paternal Uncle     Pancreatic cancer Paternal Uncle     Cancer Paternal Uncle     Breast cancer Family     No Known Problems Sister     No Known Problems Brother      Social History     Social History    Marital status: /Civil Union     Spouse name: N/A    Number of children: N/A    Years of education: N/A     Occupational History    Not on file  Social History Main Topics    Smoking status: Never Smoker    Smokeless tobacco: Never Used    Alcohol use Not on file    Drug use: No    Sexual activity: Yes     Partners: Male     Birth control/ protection: IUD      Comment:      Other Topics Concern    Not on file     Social History Narrative    Caffeine use    Uses safety equipment, seat belts       Occupational History: Human Resources at office    Meds/Allergies   Allergies   Allergen Reactions    Other Allergic Rhinitis     environmental    Pollen Extract Other (See Comments)     Sinusitis       Home medications:  Prior to Admission medications    Medication Sig Start Date End Date Taking? Authorizing Provider   butalbital-acetaminophen-caffeine (FIORICET,ESGIC) -40 mg per tablet Take 1 tablet by mouth every 4 (four) hours as needed for headaches   Yes Historical Provider, MD   albuterol (VENTOLIN HFA) 90 mcg/act inhaler Inhale 2 puffs every 6 (six) hours as needed for wheezing 1/31/19   Cecilia Greer MD       Vitals:   Blood pressure 110/60, pulse 84, temperature 98 7 °F (37 1 °C), resp  rate 14, height 5' 6" (1 676 m), weight 80 7 kg (178 lb), SpO2 97 %, currently breastfeeding , RA, Body mass index is 28 73 kg/m²         Physical Exam  General: Pleasant, Awake alert and oriented x 3, conversant without conversational dyspnea, NAD, normal affect  HEENT:  PERRL, Sclera noninjected, nonicteric OU, Nares patent,  no craniofacial abnormalities, Mucous membranes, moist, no oral lesions, normal dentition  NECK: Trachea midline, no accessory muscle use, no stridor, no cervical or supraclavicular adenopathy, JVP not elevated  CARDIAC: Reg, single s1/S2, no m/r/g  PULM: CTA bilaterally no wheezing, rhonchi or rales  ABD: Normoactive bowel sounds, soft nontender, nondistended, no rebound, no rigidity, no guarding  EXT: No cyanosis, no clubbing, no edema, normal capillary refill  NEURO: no focal neurologic deficits, AAOx3, moving all extremities appropriately    Labs: I have personally reviewed pertinent lab results  Lab Results   Component Value Date    WBC 11 60 (H) 03/01/2018    HGB 12 4 03/01/2018    HCT 35 8 03/01/2018    MCV 86 03/01/2018     03/01/2018      Lab Results   Component Value Date    GLUCOSE 92 11/15/2016    CO2 25 11/15/2016       PFTs:  The most recent pulmonary function tests were reviewed  Dearl Qualia in office today  Prebronchodilator  FVC - 3 53 (88%)  FEV1 - 2 98 (90%)  FEV1/FVC - 84%    Post bronchodilator  FVC - 3 33 (83%)  FEV1 - 2 96 (90%)  FEV1/FVC -  89%  Normal spirometry     Imaging  I personally reviewed the images on the [facility] system pertinent to today's visit  CXR PA and lateral - IMPRESSION:  Patchy left lower lobe airspace opacity suspicious for pneumonia  The study was marked in [facility] for immediate notification                                         DO Rosa Carbajal 71 Sleep Physician

## 2019-02-10 ENCOUNTER — HOSPITAL ENCOUNTER (OUTPATIENT)
Dept: RADIOLOGY | Facility: HOSPITAL | Age: 34
Discharge: HOME | End: 2019-02-10
Attending: INTERNAL MEDICINE
Payer: COMMERCIAL

## 2019-02-10 DIAGNOSIS — J12.9 VIRAL PNEUMONIA: ICD-10-CM

## 2019-02-10 PROCEDURE — 71046 X-RAY EXAM CHEST 2 VIEWS: CPT

## 2019-02-13 ENCOUNTER — TELEPHONE (OUTPATIENT)
Dept: PULMONOLOGY | Facility: CLINIC | Age: 34
End: 2019-02-13

## 2019-02-13 NOTE — TELEPHONE ENCOUNTER
Per Dr Pena Mention I called patient and left Voice Mail informing her that her chest X-Ray came back normal  I left my office number on her Voicemail should she have any questions or concerns

## 2019-02-27 ENCOUNTER — OFFICE VISIT (OUTPATIENT)
Dept: PULMONOLOGY | Facility: CLINIC | Age: 34
End: 2019-02-27
Payer: COMMERCIAL

## 2019-02-27 ENCOUNTER — APPOINTMENT (OUTPATIENT)
Dept: LAB | Facility: HOSPITAL | Age: 34
End: 2019-02-27
Payer: COMMERCIAL

## 2019-02-27 VITALS
HEIGHT: 65 IN | SYSTOLIC BLOOD PRESSURE: 110 MMHG | DIASTOLIC BLOOD PRESSURE: 70 MMHG | RESPIRATION RATE: 14 BRPM | BODY MASS INDEX: 29.52 KG/M2 | TEMPERATURE: 98.7 F | WEIGHT: 177.2 LBS | OXYGEN SATURATION: 98 % | HEART RATE: 80 BPM

## 2019-02-27 DIAGNOSIS — J30.2 SEASONAL ALLERGIES: Primary | ICD-10-CM

## 2019-02-27 DIAGNOSIS — R05.9 COUGH: ICD-10-CM

## 2019-02-27 DIAGNOSIS — J30.2 SEASONAL ALLERGIES: ICD-10-CM

## 2019-02-27 PROCEDURE — 82785 ASSAY OF IGE: CPT

## 2019-02-27 PROCEDURE — 36415 COLL VENOUS BLD VENIPUNCTURE: CPT

## 2019-02-27 PROCEDURE — 99213 OFFICE O/P EST LOW 20 MIN: CPT | Performed by: PHYSICIAN ASSISTANT

## 2019-02-27 PROCEDURE — 86003 ALLG SPEC IGE CRUDE XTRC EA: CPT

## 2019-02-27 RX ORDER — OMEPRAZOLE 40 MG/1
40 CAPSULE, DELAYED RELEASE ORAL DAILY
Qty: 30 CAPSULE | Refills: 0 | Status: SHIPPED | OUTPATIENT
Start: 2019-02-27 | End: 2021-04-14 | Stop reason: ALTCHOICE

## 2019-02-27 RX ORDER — RIZATRIPTAN BENZOATE 5 MG/1
5 TABLET ORAL ONCE AS NEEDED
COMMUNITY
End: 2021-04-14 | Stop reason: SDUPTHER

## 2019-02-27 NOTE — PROGRESS NOTES
Pulmonary Follow Up Note   Emma Beavers 26 y o  female MRN: [id number]  2/27/2019      Assessment:  Patient 47-year-old female past medical history significant for migraine headache and seasonal allergies  She presents to the office today for follow-up  She was found to have an abnormal chest x-ray the end of January which did show improvement on further imaging  Her symptoms have improved however she still having lingering cough, occasional wheezing, and sputum production  I will order an IgG and [facility] an allergy panel  I will have her continue Stiolto  She has been using the nasal steroids but reports little improvement with her symptoms with that  I have asked that she start omeprazole to see if she is having possible silent reflux and that causing her symptoms to worsen  I will discuss with my attending physician to see if a complete pulmonary function test is warranted versus a methacholine challenge  I would like her to follow up with Dr Randy Norton next 6-8 weeks for continued monitoring workup  She knows to call our office with any questions or concerns ago to the hospital should her symptoms worsen, change, or returned  All questions were answered  Will order pulmonary function testing  Scheduled for 6-8 weeks from now prior to follow-up with Dr Angel Dumont allergies  1  Obtaining IgE [facility] allergy panel  2  Continue Stiolto   3  Continue albuterol inhaler as needed  4  Follow-up with Dr Randy Norton in 6-8 weeks  Will discuss with my attending in regards to ordering possible pulmonary function test/methacholine challenge for further evaluation of possible asthma  Cough  5  Start omeprazole for the next 30 days to see if this improves patient's cough  Plan:    Diagnoses and all orders for this visit:    Seasonal allergies  -     IgE; Future  -     [facility] Allergy Panel, Adult;  Future  -     Complete pulmonary function test; Future    Cough  - omeprazole (PriLOSEC) 40 MG capsule; Take 1 capsule (40 mg total) by mouth daily for 30 days  -     Complete pulmonary function test; Future    Other orders  -     levonorgestrel (MIRENA) 20 MCG/24HR IUD; 1 each by Intrauterine route  -     rizatriptan (MAXALT) 5 MG tablet; Take 5 mg by mouth once as needed for migraine May repeat in 2 hours if needed  -     tiotropium-olodaterol (STIOLTO RESPIMAT) 2 5-2 5 MCG/ACT inhaler; Inhale 2 puffs daily        No follow-ups on file  History of Present Illness   HPI:  Radha Zimmerman is a 26 y o  female who presents to the office today for follow-up she initially saw Dr Wilma Thacker on 01/31/2019 in the pulmonary office for persistent cough and bronchitis  Patient had been suffering from an upper respiratory infection since the beginning of January  She had chest x-ray that showed a left lower lobe pneumonia  Patient had previously completed a course of steroids, azithromycin, and Flovent inhaler and then was transitioned to doxycycline but had little improvement in her symptoms  Dr Wilma Thacker felt that her pneumonia was likely viral in nature and over the antibiotics were needed  She was suggested Stiolto repeat chest x-ray in 4 weeks and re-evaluation  Patient presents to the office today for follow-up  Her chest x-ray has improved and showed resolution of her left lower lobe patchy infiltrates  Patient reports that in general she is feeling better and having more energy however she is still having some occasional wheezing, nighttime coughing, and thick stringy yellow sputum production  Patient reports that she coughed up small amounts intermittently throughout the day and could not quantify any further  Patient denies any current fevers or chills or malaise  She reports that she does have multiple sick contacts with 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 in day care and she feels should just be picking up new viruses throughout the year        Review of Systems Constitutional: Negative for activity change, appetite change, chills, diaphoresis, fatigue and fever  HENT: Positive for postnasal drip  Respiratory: Positive for cough  Small amounts of yellow sputum production  Cardiovascular: Negative for chest pain, palpitations and leg swelling  Gastrointestinal: Negative for abdominal distention, constipation, diarrhea, nausea and vomiting  Skin: Negative for rash  Allergic/Immunologic: Negative for immunocompromised state  Neurological: Negative for dizziness  Hematological: Negative for adenopathy  Psychiatric/Behavioral: Negative for agitation  All other systems reviewed and are negative        Historical Information   Past Medical History:   Diagnosis Date    Asthma     seasonal resolved    Breast disorder     cyst benign    Intramural leiomyoma of uterus     Migraine     Seasonal allergies     Varicella     childhood     Past Surgical History:   Procedure Laterality Date    NASAL SEPTUM SURGERY      TOOTH EXTRACTION       Family History   Problem Relation Age of Onset    Hyperlipidemia Mother     Hypertension Mother     Lung cancer Father     Cancer Father     Heart disease Maternal Grandmother     Hypertension Maternal Grandmother     Osteoporosis Maternal Grandmother     Heart disease Maternal Grandfather     Heart attack Paternal Grandfather     Heart disease Paternal Grandfather     Other Maternal Aunt         breast disorders    Alcohol abuse Paternal Uncle     Pancreatic cancer Paternal Uncle     Cancer Paternal Uncle     Breast cancer Family     No Known Problems Sister     No Known Problems Brother          Meds/Allergies     Current Outpatient Medications:     albuterol (VENTOLIN HFA) 90 mcg/act inhaler, Inhale 2 puffs every 6 (six) hours as needed for wheezing, Disp: 18 g, Rfl: 0    levonorgestrel (MIRENA) 20 MCG/24HR IUD, 1 each by Intrauterine route, Disp: , Rfl:     rizatriptan (MAXALT) 5 MG tablet, Take 5 mg by mouth once as needed for migraine May repeat in 2 hours if needed, Disp: , Rfl:     tiotropium-olodaterol (STIOLTO RESPIMAT) 2 5-2 5 MCG/ACT inhaler, Inhale 2 puffs daily, Disp: , Rfl:     omeprazole (PriLOSEC) 40 MG capsule, Take 1 capsule (40 mg total) by mouth daily for 30 days, Disp: 30 capsule, Rfl: 0  Allergies   Allergen Reactions    Other Allergic Rhinitis     environmental    Pollen Extract Other (See Comments)     Sinusitis       Vitals: Blood pressure 110/70, pulse 80, temperature 98 7 °F (37 1 °C), resp  rate 14, height 5' 5" (1 651 m), weight 80 4 kg (177 lb 3 2 oz), SpO2 98 %, currently breastfeeding  Body mass index is 29 49 kg/m²  Physical Exam  Physical Exam   Constitutional: She is oriented to person, place, and time  She appears well-developed and well-nourished  No distress  HENT:   Head: Normocephalic and atraumatic  Nose: Nose normal    Mouth/Throat: Oropharynx is clear and moist  No oropharyngeal exudate  Eyes: Pupils are equal, round, and reactive to light  EOM are normal  No scleral icterus  Neck: Normal range of motion  Neck supple  No JVD present  No tracheal deviation present  No thyromegaly present  Cardiovascular: Normal rate, regular rhythm and normal heart sounds  Exam reveals no gallop and no friction rub  No murmur heard  Pulmonary/Chest: Effort normal  No stridor  No respiratory distress  Patient with end expiratory wheeze noted over the left upper lobe that did clear with coughing and deep breathing  Abdominal: Soft  Bowel sounds are normal  She exhibits no distension  There is no tenderness  Lymphadenopathy:     She has no cervical adenopathy  Neurological: She is alert and oriented to person, place, and time  No cranial nerve deficit  Skin: Skin is warm and dry  She is not diaphoretic  Psychiatric: She has a normal mood and affect  Nursing note and vitals reviewed  Labs: I have personally reviewed pertinent lab results  , ABG: No results found for: PHART, POV6GYL, PO2ART, DLJ1PAH, R2OOBIFP, BEART, SOURCE, BNP: No results found for: BNP, CBC: No results found for: WBC, HGB, HCT, MCV, PLT, ADJUSTEDWBC, MCH, MCHC, RDW, MPV, NRBC, CMP: No results found for: SODIUM, K, CL, CO2, ANIONGAP, BUN, CREATININE, GLUCOSE, CALCIUM, AST, ALT, ALKPHOS, PROT, BILITOT, EGFR, PT/INR: No results found for: PT, INR, Troponin: No results found for: TROPONINI  Lab Results   Component Value Date    WBC 11 60 (H) 03/01/2018    HGB 12 4 03/01/2018    HCT 35 8 03/01/2018    MCV 86 03/01/2018     03/01/2018     Lab Results   Component Value Date    GLUCOSE 92 11/15/2016    CO2 25 11/15/2016     No results found for: IGE  No results found for: ALT, AST, GGT, ALKPHOS, BILITOT      Imaging and other studies: I have personally reviewed pertinent reports  Chest x-ray 02/10/2019  FINDINGS:     Cardiomediastinal silhouette appears unremarkable      The lungs are clear  No pneumothorax or pleural effusion      Osseous structures appear within normal limits for patient age      IMPRESSION:     No acute cardiopulmonary disease  In office spirometry during last visit was normal    EKG, Pathology, and Other Studies: I have personally reviewed pertinent reports          Rigoberto Queen PA-C

## 2019-02-27 NOTE — ASSESSMENT & PLAN NOTE
1  Obtaining IgE [facility] allergy panel  2  Continue Stiolto   3  Continue albuterol inhaler as needed  4  Follow-up with Dr Lilian Ferrara in 6-8 weeks  Will discuss with my attending in regards to ordering possible pulmonary function test/methacholine challenge for further evaluation of possible asthma

## 2019-02-27 NOTE — PATIENT INSTRUCTIONS
Seasonal allergies  1  Obtaining IgE [facility] allergy panel  2  Continue Stiolto   3  Continue albuterol inhaler as needed  4  Follow-up with Dr David Mcclendon in 6-8 weeks  Will discuss with my attending in regards to ordering possible pulmonary function test/methacholine challenge for further evaluation of possible asthma  Cough  5  Start omeprazole for the next 30 days to see if this improves patient's cough

## 2019-03-01 ENCOUNTER — TELEPHONE (OUTPATIENT)
Dept: OTHER | Facility: HOSPITAL | Age: 34
End: 2019-03-01

## 2019-03-01 LAB
A ALTERNATA IGE QN: 0.28 KUA/I
A FUMIGATUS IGE QN: <0.1 KUA/I
ALLERGEN COMMENT: ABNORMAL
BERMUDA GRASS IGE QN: <0.1 KUA/I
BOXELDER IGE QN: <0.1 KUA/I
C HERBARUM IGE QN: <0.1 KUA/I
CAT DANDER IGE QN: 0.31 KUA/I
CMN PIGWEED IGE QN: <0.1 KUA/I
COMMON RAGWEED IGE QN: <0.1 KUA/I
COTTONWOOD IGE QN: <0.1 KUA/I
D FARINAE IGE QN: <0.1 KUA/I
D PTERONYSS IGE QN: <0.1 KUA/I
DOG DANDER IGE QN: <0.1 KUA/I
LONDON PLANE IGE QN: <0.1 KUA/I
MOUSE URINE PROT IGE QN: <0.1 KUA/I
MT JUNIPER IGE QN: <0.1 KUA/I
MUGWORT IGE QN: <0.1 KUA/I
P NOTATUM IGE QN: <0.1 KUA/I
ROACH IGE QN: <0.1 KUA/I
SHEEP SORREL IGE QN: <0.1 KUA/I
SILVER BIRCH IGE QN: <0.1 KUA/I
TIMOTHY IGE QN: <0.1 KUA/I
TOTAL IGE SMQN RAST: 2.84 KU/L (ref 0–113)
WALNUT IGE QN: <0.1 KUA/I
WHITE ASH IGE QN: <0.1 KUA/I
WHITE ELM IGE QN: <0.1 KUA/I
WHITE MULBERRY IGE QN: <0.1 KUA/I
WHITE OAK IGE QN: <0.1 KUA/I

## 2019-03-01 NOTE — TELEPHONE ENCOUNTER
Patient was called and updated on the results of her [facility] allergy panel  Plan to get PFTs and follow up with Dr Mccain Laser

## 2019-04-22 ENCOUNTER — HOSPITAL ENCOUNTER (OUTPATIENT)
Dept: PULMONOLOGY | Facility: HOSPITAL | Age: 34
Discharge: HOME | End: 2019-04-22
Payer: COMMERCIAL

## 2019-04-22 DIAGNOSIS — R05.9 COUGH: ICD-10-CM

## 2019-04-22 DIAGNOSIS — J30.2 SEASONAL ALLERGIES: ICD-10-CM

## 2019-04-22 PROCEDURE — 94729 DIFFUSING CAPACITY: CPT | Performed by: INTERNAL MEDICINE

## 2019-04-22 PROCEDURE — 94060 EVALUATION OF WHEEZING: CPT | Performed by: INTERNAL MEDICINE

## 2019-04-22 PROCEDURE — 94060 EVALUATION OF WHEEZING: CPT

## 2019-04-22 PROCEDURE — 94726 PLETHYSMOGRAPHY LUNG VOLUMES: CPT | Performed by: INTERNAL MEDICINE

## 2019-04-22 PROCEDURE — 94726 PLETHYSMOGRAPHY LUNG VOLUMES: CPT

## 2019-04-22 PROCEDURE — 94760 N-INVAS EAR/PLS OXIMETRY 1: CPT

## 2019-04-22 PROCEDURE — 94729 DIFFUSING CAPACITY: CPT

## 2019-04-22 RX ORDER — ALBUTEROL SULFATE 2.5 MG/3ML
2.5 SOLUTION RESPIRATORY (INHALATION) ONCE
Status: COMPLETED | OUTPATIENT
Start: 2019-04-22 | End: 2019-04-22

## 2019-04-22 RX ADMIN — ALBUTEROL SULFATE 2.5 MG: 2.5 SOLUTION RESPIRATORY (INHALATION) at 07:44

## 2019-04-23 RX ORDER — FEXOFENADINE HCL AND PSEUDOEPHEDRINE HCI 180; 240 MG/1; MG/1
TABLET, EXTENDED RELEASE ORAL
COMMUNITY
Start: 2019-03-14 | End: 2022-01-12

## 2019-04-24 ENCOUNTER — OFFICE VISIT (OUTPATIENT)
Dept: PULMONOLOGY | Facility: CLINIC | Age: 34
End: 2019-04-24
Payer: COMMERCIAL

## 2019-04-24 VITALS
RESPIRATION RATE: 14 BRPM | TEMPERATURE: 98.8 F | DIASTOLIC BLOOD PRESSURE: 70 MMHG | WEIGHT: 178 LBS | BODY MASS INDEX: 29.66 KG/M2 | HEIGHT: 65 IN | HEART RATE: 69 BPM | SYSTOLIC BLOOD PRESSURE: 104 MMHG | OXYGEN SATURATION: 100 %

## 2019-04-24 DIAGNOSIS — R05.9 COUGH: Primary | ICD-10-CM

## 2019-04-24 DIAGNOSIS — R93.89 ABNORMAL CXR: ICD-10-CM

## 2019-04-24 DIAGNOSIS — J45.21 MILD INTERMITTENT ASTHMATIC BRONCHITIS WITH ACUTE EXACERBATION: ICD-10-CM

## 2019-04-24 PROCEDURE — 99213 OFFICE O/P EST LOW 20 MIN: CPT | Performed by: INTERNAL MEDICINE

## 2019-04-24 RX ORDER — PREDNISONE 20 MG/1
40 TABLET ORAL DAILY
Qty: 10 TABLET | Refills: 0 | Status: SHIPPED | OUTPATIENT
Start: 2019-04-24 | End: 2019-07-18 | Stop reason: ALTCHOICE

## 2019-07-18 ENCOUNTER — OFFICE VISIT (OUTPATIENT)
Dept: PULMONOLOGY | Facility: CLINIC | Age: 34
End: 2019-07-18
Payer: COMMERCIAL

## 2019-07-18 VITALS
DIASTOLIC BLOOD PRESSURE: 76 MMHG | OXYGEN SATURATION: 99 % | SYSTOLIC BLOOD PRESSURE: 118 MMHG | WEIGHT: 178 LBS | TEMPERATURE: 98.8 F | HEIGHT: 66 IN | BODY MASS INDEX: 28.61 KG/M2 | HEART RATE: 74 BPM

## 2019-07-18 DIAGNOSIS — J45.990 EXERCISE INDUCED BRONCHOSPASM: Primary | ICD-10-CM

## 2019-07-18 DIAGNOSIS — R05.9 COUGH: ICD-10-CM

## 2019-07-18 DIAGNOSIS — J30.2 SEASONAL ALLERGIES: ICD-10-CM

## 2019-07-18 PROCEDURE — 99214 OFFICE O/P EST MOD 30 MIN: CPT | Performed by: PHYSICIAN ASSISTANT

## 2019-07-18 RX ORDER — ALBUTEROL SULFATE 90 UG/1
2 AEROSOL, METERED RESPIRATORY (INHALATION) EVERY 4 HOURS PRN
Qty: 18 G | Refills: 5 | Status: SHIPPED | OUTPATIENT
Start: 2019-07-18 | End: 2021-04-14 | Stop reason: ALTCHOICE

## 2019-07-18 NOTE — PROGRESS NOTES
Assessment:    1  Exercise induced bronchospasm  albuterol (VENTOLIN HFA) 90 mcg/act inhaler   2  Seasonal allergies     3  Cough          02/10/2019 chest x-ray reviewed  Lungs clear no acute cardiopulmonary disease  Patchy left lower lobe airspace opacity resolved  Allergy panel was negative  April 02/20/2019 complete PFT normal     Plan:   ·   Patient will continue albuterol 2 puffs Q 4 p r n  Delma Rojas Also recommended using her albuterol inhaler approximately 20 minutes before any strenuous exercise  To help prevent any exercise-induced bronchospasm  ·   Recommended patient continue to use her Asmanex b i d  Delma Rojas Two samples given to patient  ·   Patient's symptoms have dramatically improved since her last office visit and currently denying any shortness of breath or cough  Patient would like to follow-up on a as needed basis  Instructed to call with any questions/concerns or recurrent worsening symptoms  Subjective:     Patient ID: Milagros Hernández is a 37 y o  female  Chief Complaint:  HPI     49-year-old female here for follow-up visit  Last office visit April 2019 when she was seen for follow-up secondary to pneumonia January 2019  Pneumonia was suspected to be viral  Patient completed course of doxycycline /Zithromax   Little clinical improvement  Patient developed persistent cough and bronchospasm post pneumonia which lasted approximately  3-4 months  Patient states her symptoms have significantly improved and denying any cough whatsoever at this time  Patient's pulmonary medications consist of albuterol Q 4 p r n  Which she rarely uses and Asmanex 200 twice daily which she states all uses occasionally  Patient stated fairly active and on rare occasion has noticed some mild dyspnea /bronchospasm with strenuous activity  Patient seasonal allergies have not been an issue this year in patient states not taking her Allegra either    Patient denying nasal congestion, sneezing or watery eyes     Review of Systems   Constitutional: Negative  HENT: Negative  Respiratory: Negative  Cardiovascular: Negative  Gastrointestinal: Negative  Neurological: Negative  Past medical history, social history, surgical history, medications and allergies were reviewed  Objective:  /76 (BP Location: Left arm, Patient Position: Sitting)   Pulse 74   Temp 98 8 °F (37 1 °C) (Tympanic)   Ht 5' 6" (1 676 m)   Wt 80 7 kg (178 lb)   SpO2 99%   BMI 28 73 kg/m²     Room-air  Physical Exam   Constitutional: She is oriented to person, place, and time  She appears well-developed and well-nourished  No distress  HENT:   Head: Normocephalic  Mouth/Throat: Oropharynx is clear and moist    Eyes: Conjunctivae are normal  No scleral icterus  Neck: Neck supple  No tracheal deviation present  Cardiovascular: Normal rate, regular rhythm and normal heart sounds  No murmur heard  Pulmonary/Chest: Effort normal and breath sounds normal  No stridor  No respiratory distress  She has no wheezes  She has no rales  She exhibits no tenderness  Abdominal: Soft  Bowel sounds are normal  There is no tenderness  Musculoskeletal: She exhibits no edema or tenderness  Lymphadenopathy:     She has no cervical adenopathy  Neurological: She is alert and oriented to person, place, and time  Skin: Skin is warm and dry  Capillary refill takes less than 2 seconds  She is not diaphoretic  Psychiatric: She has a normal mood and affect  Nursing note and vitals reviewed

## 2019-11-06 ENCOUNTER — ANNUAL EXAM (OUTPATIENT)
Dept: OBGYN CLINIC | Facility: CLINIC | Age: 34
End: 2019-11-06
Payer: COMMERCIAL

## 2019-11-06 VITALS
BODY MASS INDEX: 29.63 KG/M2 | WEIGHT: 184.4 LBS | DIASTOLIC BLOOD PRESSURE: 82 MMHG | HEIGHT: 66 IN | SYSTOLIC BLOOD PRESSURE: 110 MMHG

## 2019-11-06 DIAGNOSIS — Z01.419 ENCOUNTER FOR GYNECOLOGICAL EXAMINATION WITHOUT ABNORMAL FINDING: Primary | ICD-10-CM

## 2019-11-06 PROCEDURE — 99395 PREV VISIT EST AGE 18-39: CPT | Performed by: OBSTETRICS & GYNECOLOGY

## 2019-11-06 NOTE — PROGRESS NOTES
Carola LIA Betts  1985      CC:  Yearly exam    S:  37 y o  female here for yearly exam  Her cycles are regular, very light on Mirena without cramping  She is happy with this  Sexual activity: She is sexually active without pain, bleeding or dryness  Contraception: She uses Mirena for contraception  She has had some day to day dryness which she attributed to the Mirena  Discussed possible causes  She will call her family doctor if eye or mouth dryness occurs  Otherwise she will continue to use coconut oil which is helping  Last Pap 10/18/2018 - normal/negative HPV    We reviewed [facility] guidelines for Pap testing today         Current Outpatient Medications:     levonorgestrel (MIRENA) 20 MCG/24HR IUD, 1 each by Intrauterine route, Disp: , Rfl:     rizatriptan (MAXALT) 5 MG tablet, Take 5 mg by mouth once as needed for migraine May repeat in 2 hours if needed, Disp: , Rfl:     albuterol (VENTOLIN HFA) 90 mcg/act inhaler, Inhale 2 puffs every 4 (four) hours as needed for wheezing or shortness of breath (Patient not taking: Reported on 11/6/2019), Disp: 18 g, Rfl: 5    fexofenadine-pseudoephedrine (ALLEGRA-D 24) 180-240 MG per 24 hr tablet, TAKE 1 TABLET BY MOUTH EVERY DAY, Disp: , Rfl:     Mometasone Furoate (ASMANEX HFA) 200 MCG/ACT AERO, Inhale 2 (two) times a day, Disp: , Rfl:     omeprazole (PriLOSEC) 40 MG capsule, Take 1 capsule (40 mg total) by mouth daily for 30 days (Patient not taking: Reported on 4/24/2019), Disp: 30 capsule, Rfl: 0  Social History     Socioeconomic History    Marital status: /Civil Union     Spouse name: Not on file    Number of children: Not on file    Years of education: Not on file    Highest education level: Not on file   Occupational History    Not on file   Social Needs    Financial resource strain: Not on file    Food insecurity:     Worry: Not on file     Inability: Not on file    Transportation needs:     Medical: Not on file Non-medical: Not on file   Tobacco Use    Smoking status: Never Smoker    Smokeless tobacco: Never Used   Substance and Sexual Activity    Alcohol use:  Yes     Alcohol/week: 1 0 - 2 0 standard drinks     Types: 1 - 2 Glasses of wine per week     Frequency: Monthly or less    Drug use: No    Sexual activity: Yes     Partners: Male     Birth control/protection: IUD     Comment:    Lifestyle    Physical activity:     Days per week: Not on file     Minutes per session: Not on file    Stress: Not on file   Relationships    Social connections:     Talks on phone: Not on file     Gets together: Not on file     Attends [employer] service: Not on file     Active member of club or organization: Not on file     Attends meetings of clubs or organizations: Not on file     Relationship status: Not on file    Intimate partner violence:     Fear of current or ex partner: Not on file     Emotionally abused: Not on file     Physically abused: Not on file     Forced sexual activity: Not on file   Other Topics Concern    Not on file   Social History Narrative    Caffeine use    Uses safety equipment, seat belts     Family History   Problem Relation Age of Onset    Hyperlipidemia Mother     Hypertension Mother     Lung cancer Father     Cancer Father     Heart disease Maternal Grandmother     Hypertension Maternal Grandmother     Osteoporosis Maternal Grandmother     Heart disease Maternal Grandfather     Heart attack Paternal Grandfather     Heart disease Paternal Grandfather     Other Maternal Aunt         breast disorders    Alcohol abuse Paternal Uncle     Pancreatic cancer Paternal Uncle     Cancer Paternal Uncle     Breast cancer Family     No Known Problems Sister     No Known Problems Brother       Past Medical History:   Diagnosis Date    Asthma     seasonal resolved    Breast disorder     cyst benign    Intramural leiomyoma of uterus     Migraine     Seasonal allergies     Varicella childhood        Review of Systems   Respiratory: Negative  Cardiovascular: Negative  Gastrointestinal: Negative for constipation and diarrhea  Genitourinary: Negative for difficulty urinating, pelvic pain, vaginal bleeding, vaginal discharge, itching or odor  O:  Blood pressure 110/82, height 5' 6" (1 676 m), weight 83 6 kg (184 lb 6 4 oz), last menstrual period 11/03/2019, currently breastfeeding  Patient appears well and is not in distress  Neck is supple without masses  Breasts are symmetrical without mass, tenderness, nipple discharge, skin changes or adenopathy  Abdomen is soft and nontender without masses  External genitals are normal without lesions or rashes  Urethral meatus and urethra are normal  Bladder is normal to palpation  Vagina is normal without discharge or bleeding  Cervix is normal without discharge or lesion  IUD strings seen  Uterus is normal, mobile, nontender without palpable mass  Adnexa are normal, nontender, without palpable mass  A:  Yearly exam      P:   Pap due 2023   RTO one year for yearly exam or sooner as needed

## 2020-11-12 ENCOUNTER — ANNUAL EXAM (OUTPATIENT)
Dept: OBGYN CLINIC | Facility: CLINIC | Age: 35
End: 2020-11-12
Payer: COMMERCIAL

## 2020-11-12 VITALS
BODY MASS INDEX: 30.66 KG/M2 | WEIGHT: 184 LBS | TEMPERATURE: 97.7 F | SYSTOLIC BLOOD PRESSURE: 110 MMHG | HEIGHT: 65 IN | DIASTOLIC BLOOD PRESSURE: 74 MMHG

## 2020-11-12 DIAGNOSIS — Z01.419 ENCOUNTER FOR GYNECOLOGICAL EXAMINATION (GENERAL) (ROUTINE) WITHOUT ABNORMAL FINDINGS: Primary | ICD-10-CM

## 2020-11-12 PROCEDURE — 99395 PREV VISIT EST AGE 18-39: CPT | Performed by: OBSTETRICS & GYNECOLOGY

## 2021-02-19 ENCOUNTER — NURSE TRIAGE (OUTPATIENT)
Dept: OTHER | Facility: OTHER | Age: 36
End: 2021-02-19

## 2021-02-19 DIAGNOSIS — Z20.822 SUSPECTED SEVERE ACUTE RESPIRATORY SYNDROME CORONAVIRUS 2 (SARS-COV-2) INFECTION: ICD-10-CM

## 2021-02-19 DIAGNOSIS — Z20.822 SUSPECTED SEVERE ACUTE RESPIRATORY SYNDROME CORONAVIRUS 2 (SARS-COV-2) INFECTION: Primary | ICD-10-CM

## 2021-02-19 PROCEDURE — U0005 INFEC AGEN DETEC AMPLI PROBE: HCPCS | Performed by: FAMILY MEDICINE

## 2021-02-19 PROCEDURE — U0003 INFECTIOUS AGENT DETECTION BY NUCLEIC ACID (DNA OR RNA); SEVERE ACUTE RESPIRATORY SYNDROME CORONAVIRUS 2 (SARS-COV-2) (CORONAVIRUS DISEASE [COVID-19]), AMPLIFIED PROBE TECHNIQUE, MAKING USE OF HIGH THROUGHPUT TECHNOLOGIES AS DESCRIBED BY CMS-2020-01-R: HCPCS | Performed by: FAMILY MEDICINE

## 2021-02-19 NOTE — TELEPHONE ENCOUNTER
Regarding: COVID- Symptomatic/ Sore throat  ----- Message from [address] sent at 2/19/2021 10:31 AM EST -----  "I would like a script to get tested for COVID-19   I'm experiencing a sore throat and headaches "

## 2021-02-19 NOTE — TELEPHONE ENCOUNTER
1  Were you within 6 feet or less, for up to 15 minutes or more with a person that has a confirmed COVID-19 test?   Daughter was exposed at  on 2/10/2021  She developed symptoms on 2/17/2021   2  What was the date of your exposure? Lives in same home  3  Are you experiencing any symptoms attributed to the virus?  (Assess for SOB, cough, fever, difficulty breathing)   Started 2/17/2021  Sore throat  Headache  Denies fever, cough or SOB  Fatigue  4  HIGH RISK: Do you have any history heart or lung conditions, weakened immune system, diabetes, Asthma, CHF, HIV, COPD, Chemo, renal failure, sickle cell, etc?   Denied    5  PREGNANCY: Are you pregnant or did you recently give birth?    Denied

## 2021-02-20 LAB — SARS-COV-2 RNA RESP QL NAA+PROBE: NEGATIVE

## 2021-04-05 DIAGNOSIS — Z23 ENCOUNTER FOR IMMUNIZATION: ICD-10-CM

## 2021-04-14 ENCOUNTER — OFFICE VISIT (OUTPATIENT)
Dept: FAMILY MEDICINE CLINIC | Facility: CLINIC | Age: 36
End: 2021-04-14
Payer: COMMERCIAL

## 2021-04-14 VITALS
HEIGHT: 66 IN | DIASTOLIC BLOOD PRESSURE: 82 MMHG | BODY MASS INDEX: 29.96 KG/M2 | SYSTOLIC BLOOD PRESSURE: 116 MMHG | HEART RATE: 69 BPM | OXYGEN SATURATION: 100 % | WEIGHT: 186.4 LBS | TEMPERATURE: 98.2 F | RESPIRATION RATE: 16 BRPM

## 2021-04-14 DIAGNOSIS — E55.9 VITAMIN D DEFICIENCY: ICD-10-CM

## 2021-04-14 DIAGNOSIS — G43.709 CHRONIC MIGRAINE WITHOUT AURA WITHOUT STATUS MIGRAINOSUS, NOT INTRACTABLE: ICD-10-CM

## 2021-04-14 DIAGNOSIS — N89.8 VAGINAL DRYNESS: ICD-10-CM

## 2021-04-14 DIAGNOSIS — Z00.00 ANNUAL PHYSICAL EXAM: Primary | ICD-10-CM

## 2021-04-14 PROBLEM — R05.9 COUGH: Status: RESOLVED | Noted: 2019-02-27 | Resolved: 2021-04-14

## 2021-04-14 PROCEDURE — 99385 PREV VISIT NEW AGE 18-39: CPT | Performed by: FAMILY MEDICINE

## 2021-04-14 PROCEDURE — 3008F BODY MASS INDEX DOCD: CPT | Performed by: FAMILY MEDICINE

## 2021-04-14 PROCEDURE — 3725F SCREEN DEPRESSION PERFORMED: CPT | Performed by: FAMILY MEDICINE

## 2021-04-14 PROCEDURE — 1036F TOBACCO NON-USER: CPT | Performed by: FAMILY MEDICINE

## 2021-04-14 RX ORDER — RIZATRIPTAN BENZOATE 10 MG/1
10 TABLET ORAL ONCE AS NEEDED
Qty: 10 TABLET | Refills: 0 | Status: SHIPPED | OUTPATIENT
Start: 2021-04-14 | End: 2021-07-16 | Stop reason: SDUPTHER

## 2021-04-14 NOTE — PATIENT INSTRUCTIONS

## 2021-04-14 NOTE — PROGRESS NOTES
[address]    NAME: Rubi Crowe  AGE: 38 y o  SEX: female  : 1985     DATE: 2021     Assessment and Plan:     Problem List Items Addressed This Visit        Cardiovascular and Mediastinum    Migraine    Relevant Medications    rizatriptan (MAXALT) 10 MG tablet      Other Visit Diagnoses     Annual physical exam    -  Primary    Relevant Orders    Lipid Panel with Direct LDL reflex    Vaginal dryness        Relevant Orders    Comprehensive metabolic panel    TSH, 3rd generation with Free T4 reflex    CBC and differential    Sjogren's Antibodies    Vitamin D deficiency        Relevant Orders    Vitamin D 25 hydroxy          Immunizations and preventive care screenings were discussed with patient today  Appropriate education was printed on patient's after visit summary  Counseling:  Alcohol/drug use: discussed moderation in alcohol intake, the recommendations for healthy alcohol use, and avoidance of illicit drug use  Dental Health: discussed importance of regular tooth brushing, flossing, and dental visits  Injury prevention: discussed safety/seat belts, safety helmets, smoke detectors, carbon dioxide detectors, and smoking near bedding or upholstery  Sexual health: discussed sexually transmitted diseases, partner selection, use of condoms, avoidance of unintended pregnancy, and contraceptive alternatives  · Exercise: the importance of regular exercise/physical activity was discussed  Recommend exercise 3-5 times per week for at least 30 minutes  BMI Counseling: Body mass index is 30 14 kg/m²  The BMI is above normal  Nutrition recommendations include decreasing portion sizes and encouraging healthy choices of fruits and vegetables  Exercise recommendations include moderate physical activity 150 minutes/week  No pharmacotherapy was ordered  No follow-ups on file       Chief Complaint:     Chief Complaint   Patient presents with   [address]     Patient is here today to establish care  History of Present Illness:     Adult Annual Physical   Patient here for a comprehensive physical exam  The patient reports no problems  New patient  C/o drinking a lot of water to keep her hydrated  +vaginal dryness for 3 years ago after Mirena placement      Diet and Physical Activity  · Diet/Nutrition: well balanced diet  · Exercise: no formal exercise  Depression Screening  PHQ-9 Depression Screening    PHQ-9:   Frequency of the following problems over the past two weeks:      Little interest or pleasure in doing things: 0 - not at all  Feeling down, depressed, or hopeless: 0 - not at all  PHQ-2 Score: 0       General Health  · Sleep: sleeps well  · Hearing: normal - bilateral   · Vision: goes for regular eye exams  · Dental: regular dental visits  /GYN Health  · Last menstrual period:   · Contraceptive method: IUD placement  · History of STDs?: no      Review of Systems:     Review of Systems   Constitutional: Negative  HENT: Negative  Eyes: Negative  Respiratory: Negative  Cardiovascular: Negative  Gastrointestinal: Negative  Endocrine: Negative  Genitourinary: Negative  Musculoskeletal: Negative  Skin: Negative  Allergic/Immunologic: Negative  Neurological: Negative  Hematological: Negative  Psychiatric/Behavioral: Negative         Past Medical History:     Past Medical History:   Diagnosis Date    Asthma     seasonal resolved    Breast disorder     cyst benign    Intramural leiomyoma of uterus     Migraine     Seasonal allergies     Varicella     childhood      Past Surgical History:     Past Surgical History:   Procedure Laterality Date    NASAL SEPTUM SURGERY      TOOTH EXTRACTION        Social History:     E-Cigarette/Vaping    E-Cigarette Use Never User      E-Cigarette/Vaping Substances    Nicotine No     THC No     CBD No     Flavoring No      Social History     Socioeconomic History    Marital status: /Civil Union     Spouse name: None    Number of children: None    Years of education: None    Highest education level: None   Occupational History    None   Social Needs    Financial resource strain: None    Food insecurity     Worry: None     Inability: None    Transportation needs     Medical: None     Non-medical: None   Tobacco Use    Smoking status: Never Smoker    Smokeless tobacco: Never Used   Substance and Sexual Activity    Alcohol use: Yes     Alcohol/week: 1 0 - 2 0 standard drinks     Types: 1 - 2 Glasses of wine per week     Frequency: Monthly or less     Drinks per session: 1 or 2     Binge frequency: Never    Drug use: No    Sexual activity: Yes     Partners: Male     Birth control/protection: I U D       Comment:    Lifestyle    Physical activity     Days per week: None     Minutes per session: None    Stress: None   Relationships    Social connections     Talks on phone: None     Gets together: None     Attends Voodoo service: None     Active member of club or organization: None     Attends meetings of clubs or organizations: None     Relationship status: None    Intimate partner violence     Fear of current or ex partner: None     Emotionally abused: None     Physically abused: None     Forced sexual activity: None   Other Topics Concern    None   Social History Narrative    Caffeine use    Uses safety equipment, seat belts      Family History:     Family History   Problem Relation Age of Onset    Hyperlipidemia Mother     Hypertension Mother     Lung cancer Father     Cancer Father     Heart disease Maternal Grandmother     Hypertension Maternal Grandmother     Osteoporosis Maternal Grandmother     Heart disease Maternal Grandfather     Heart attack Paternal Grandfather     Heart disease Paternal Grandfather     Other Maternal Aunt         breast disorders    Alcohol abuse Paternal Uncle     Pancreatic cancer Paternal Uncle     Cancer Paternal Uncle     Breast cancer Family     No Known Problems Sister     No Known Problems Brother       Current Medications:     Current Outpatient Medications   Medication Sig Dispense Refill    levonorgestrel (MIRENA) 20 MCG/24HR IUD 1 each by Intrauterine route      rizatriptan (MAXALT) 10 MG tablet Take 1 tablet (10 mg total) by mouth once as needed for migraine May repeat in 2 hours if needed 10 tablet 0    fexofenadine-pseudoephedrine (ALLEGRA-D 24) 180-240 MG per 24 hr tablet TAKE 1 TABLET BY MOUTH EVERY DAY       No current facility-administered medications for this visit  Allergies: Allergies   Allergen Reactions    Other Allergic Rhinitis     environmental    Pollen Extract Other (See Comments)     Sinusitis  Sinusitis      Physical Exam:     /82 (BP Location: Left arm, Patient Position: Sitting, Cuff Size: Adult)   Pulse 69   Temp 98 2 °F (36 8 °C) (Tympanic)   Resp 16   Ht 5' 5 95" (1 675 m)   Wt 84 6 kg (186 lb 6 4 oz)   SpO2 100%   BMI 30 14 kg/m²     Physical Exam  Vitals signs and nursing note reviewed  Constitutional:       General: She is not in acute distress  Appearance: She is well-developed  HENT:      Head: Normocephalic and atraumatic  Right Ear: Tympanic membrane normal       Left Ear: Tympanic membrane normal       Nose: Nose normal       Mouth/Throat:      Mouth: Mucous membranes are moist    Eyes:      Conjunctiva/sclera: Conjunctivae normal    Neck:      Musculoskeletal: Normal range of motion and neck supple  Cardiovascular:      Rate and Rhythm: Normal rate and regular rhythm  Heart sounds: No murmur  Pulmonary:      Effort: Pulmonary effort is normal  No respiratory distress  Breath sounds: Normal breath sounds  Abdominal:      Palpations: Abdomen is soft  Tenderness: There is no abdominal tenderness  Skin:     General: Skin is warm and dry  Neurological:      General: No focal deficit present  Mental Status: She is alert and oriented to person, place, and time            Nate Sow MD   [address]

## 2021-05-24 LAB
25(OH)D3 SERPL-MCNC: 35 NG/ML (ref 30–100)
ALBUMIN SERPL-MCNC: 4.1 G/DL (ref 3.6–5.1)
ALBUMIN/GLOB SERPL: 1.9 (CALC) (ref 1–2.5)
ALP SERPL-CCNC: 26 U/L (ref 31–125)
ALT SERPL-CCNC: 9 U/L (ref 6–29)
AST SERPL-CCNC: 11 U/L (ref 10–30)
BASOPHILS # BLD AUTO: 20 CELLS/UL (ref 0–200)
BASOPHILS NFR BLD AUTO: 0.4 %
BILIRUB SERPL-MCNC: 0.5 MG/DL (ref 0.2–1.2)
BUN SERPL-MCNC: 11 MG/DL (ref 7–25)
BUN/CREAT SERPL: ABNORMAL (CALC) (ref 6–22)
CALCIUM SERPL-MCNC: 9 MG/DL (ref 8.6–10.2)
CHLORIDE SERPL-SCNC: 106 MMOL/L (ref 98–110)
CHOLEST SERPL-MCNC: 169 MG/DL
CHOLEST/HDLC SERPL: 3.4 (CALC)
CO2 SERPL-SCNC: 27 MMOL/L (ref 20–32)
CREAT SERPL-MCNC: 0.78 MG/DL (ref 0.5–1.1)
ENA SS-A AB SER IA-ACNC: NORMAL AI
ENA SS-B AB SER IA-ACNC: NORMAL AI
EOSINOPHIL # BLD AUTO: 41 CELLS/UL (ref 15–500)
EOSINOPHIL NFR BLD AUTO: 0.8 %
ERYTHROCYTE [DISTWIDTH] IN BLOOD BY AUTOMATED COUNT: 12.7 % (ref 11–15)
GLOBULIN SER CALC-MCNC: 2.2 G/DL (CALC) (ref 1.9–3.7)
GLUCOSE SERPL-MCNC: 85 MG/DL (ref 65–99)
HCT VFR BLD AUTO: 42.6 % (ref 35–45)
HDLC SERPL-MCNC: 50 MG/DL
HGB BLD-MCNC: 13.8 G/DL (ref 11.7–15.5)
LDLC SERPL CALC-MCNC: 105 MG/DL (CALC)
LYMPHOCYTES # BLD AUTO: 2514 CELLS/UL (ref 850–3900)
LYMPHOCYTES NFR BLD AUTO: 49.3 %
MCH RBC QN AUTO: 29.6 PG (ref 27–33)
MCHC RBC AUTO-ENTMCNC: 32.4 G/DL (ref 32–36)
MCV RBC AUTO: 91.2 FL (ref 80–100)
MONOCYTES # BLD AUTO: 245 CELLS/UL (ref 200–950)
MONOCYTES NFR BLD AUTO: 4.8 %
NEUTROPHILS # BLD AUTO: 2280 CELLS/UL (ref 1500–7800)
NEUTROPHILS NFR BLD AUTO: 44.7 %
NONHDLC SERPL-MCNC: 119 MG/DL (CALC)
PLATELET # BLD AUTO: 265 THOUSAND/UL (ref 140–400)
PMV BLD REES-ECKER: 11.4 FL (ref 7.5–12.5)
POTASSIUM SERPL-SCNC: 4.2 MMOL/L (ref 3.5–5.3)
PROT SERPL-MCNC: 6.3 G/DL (ref 6.1–8.1)
RBC # BLD AUTO: 4.67 MILLION/UL (ref 3.8–5.1)
SL AMB EGFR AFRICAN AMERICAN: 113 ML/MIN/1.73M2
SL AMB EGFR NON AFRICAN AMERICAN: 97 ML/MIN/1.73M2
SODIUM SERPL-SCNC: 138 MMOL/L (ref 135–146)
TRIGL SERPL-MCNC: 62 MG/DL
TSH SERPL-ACNC: 1.59 MIU/L
WBC # BLD AUTO: 5.1 THOUSAND/UL (ref 3.8–10.8)

## 2021-07-16 DIAGNOSIS — G43.709 CHRONIC MIGRAINE WITHOUT AURA WITHOUT STATUS MIGRAINOSUS, NOT INTRACTABLE: ICD-10-CM

## 2021-07-16 RX ORDER — RIZATRIPTAN BENZOATE 10 MG/1
10 TABLET ORAL ONCE AS NEEDED
Qty: 10 TABLET | Refills: 0 | Status: SHIPPED | OUTPATIENT
Start: 2021-07-16 | End: 2022-06-23

## 2021-07-16 RX ORDER — RIZATRIPTAN BENZOATE 10 MG/1
10 TABLET ORAL ONCE AS NEEDED
Qty: 10 TABLET | Refills: 0 | Status: SHIPPED | OUTPATIENT
Start: 2021-07-16 | End: 2021-07-16 | Stop reason: SDUPTHER

## 2022-01-09 ENCOUNTER — IMMUNIZATIONS (OUTPATIENT)
Dept: FAMILY MEDICINE CLINIC | Facility: HOSPITAL | Age: 37
End: 2022-01-09

## 2022-01-09 DIAGNOSIS — Z23 ENCOUNTER FOR IMMUNIZATION: Primary | ICD-10-CM

## 2022-01-09 PROCEDURE — 91300 COVID-19 PFIZER VACC 0.3 ML: CPT

## 2022-01-09 PROCEDURE — 0001A COVID-19 PFIZER VACC 0.3 ML: CPT

## 2022-01-12 ENCOUNTER — ANNUAL EXAM (OUTPATIENT)
Dept: OBGYN CLINIC | Facility: CLINIC | Age: 37
End: 2022-01-12
Payer: COMMERCIAL

## 2022-01-12 VITALS
DIASTOLIC BLOOD PRESSURE: 74 MMHG | HEIGHT: 65 IN | SYSTOLIC BLOOD PRESSURE: 120 MMHG | BODY MASS INDEX: 31.32 KG/M2 | WEIGHT: 188 LBS

## 2022-01-12 DIAGNOSIS — Z01.419 ENCOUNTER FOR GYNECOLOGICAL EXAMINATION (GENERAL) (ROUTINE) WITHOUT ABNORMAL FINDINGS: Primary | ICD-10-CM

## 2022-01-12 PROCEDURE — 1036F TOBACCO NON-USER: CPT | Performed by: OBSTETRICS & GYNECOLOGY

## 2022-01-12 PROCEDURE — 99395 PREV VISIT EST AGE 18-39: CPT | Performed by: OBSTETRICS & GYNECOLOGY

## 2022-01-12 PROCEDURE — 3008F BODY MASS INDEX DOCD: CPT | Performed by: OBSTETRICS & GYNECOLOGY

## 2022-01-12 NOTE — PROGRESS NOTES
Carola Auguste  1985      CC:  Yearly exam    S:  43 y o  female here for yearly exam  Her cycles are regular, light on Mirena, not crampy  Sexual activity: She is sexually active without pain, bleeding or dryness  Contraception: She uses Mirena for contraception  Last Pap 10/18/2020 - normal/negative HPV  Last Mammo - never    We reviewed [facility] guidelines for Pap testing today  Current Outpatient Medications:     levonorgestrel (MIRENA) 20 MCG/24HR IUD, 1 each by Intrauterine route, Disp: , Rfl:     rizatriptan (MAXALT) 10 MG tablet, Take 1 tablet (10 mg total) by mouth once as needed for migraine May repeat in 2 hours if needed, Disp: 10 tablet, Rfl: 0  Social History     Socioeconomic History    Marital status: /Civil Union     Spouse name: Not on file    Number of children: Not on file    Years of education: Not on file    Highest education level: Not on file   Occupational History    Not on file   Tobacco Use    Smoking status: Never Smoker    Smokeless tobacco: Never Used   Vaping Use    Vaping Use: Never used   Substance and Sexual Activity    Alcohol use: Yes     Alcohol/week: 1 0 - 2 0 standard drink     Types: 1 - 2 Glasses of wine per week    Drug use: No    Sexual activity: Yes     Partners: Male     Birth control/protection: I U D       Comment:    Other Topics Concern    Not on file   Social History Narrative    Caffeine use    Uses safety equipment, seat belts     Social Determinants of Health     Financial Resource Strain: Not on file   Food Insecurity: Not on file   Transportation Needs: Not on file   Physical Activity: Not on file   Stress: Not on file   Social Connections: Not on file   Intimate Partner Violence: Not on file   Housing Stability: Not on file     Family History   Problem Relation Age of Onset    Hyperlipidemia Mother     Hypertension Mother     Lung cancer Father     Cancer Father     Heart disease Maternal Grandmother     Hypertension Maternal Grandmother     Osteoporosis Maternal Grandmother     Heart disease Maternal Grandfather     Heart attack Paternal Grandfather     Heart disease Paternal Grandfather     Other Maternal Aunt         breast disorders    Alcohol abuse Paternal Uncle     Pancreatic cancer Paternal Uncle     Cancer Paternal Uncle     Breast cancer Family     No Known Problems Sister     No Known Problems Brother       Past Medical History:   Diagnosis Date    Asthma     seasonal resolved    Breast disorder     cyst benign    Intramural leiomyoma of uterus     Migraine     Seasonal allergies     Varicella     childhood        Review of Systems   Respiratory: Negative  Cardiovascular: Negative  Gastrointestinal: Negative for constipation and diarrhea  Genitourinary: Negative for difficulty urinating, pelvic pain, vaginal bleeding, vaginal discharge, itching or odor  O:  Blood pressure 120/74, height 5' 5" (1 651 m), weight 85 3 kg (188 lb), last menstrual period 12/22/2021, currently breastfeeding  Patient appears well and is not in distress  Neck is supple without masses  Breasts are symmetrical without mass, tenderness, nipple discharge, skin changes or adenopathy  Abdomen is soft and nontender without masses  External genitals are normal without lesions or rashes  Urethral meatus and urethra are normal  Bladder is normal to palpation  Vagina is normal without discharge or bleeding  Cervix is normal without discharge or lesion  +IUD strings  Uterus is normal, mobile, nontender without palpable mass  Adnexa are normal, nontender, without palpable mass  A:  Yearly exam      P:   Pap due 2025   RTO one year for yearly exam or sooner as needed

## 2022-06-23 ENCOUNTER — OFFICE VISIT (OUTPATIENT)
Dept: FAMILY MEDICINE CLINIC | Facility: CLINIC | Age: 37
End: 2022-06-23
Payer: COMMERCIAL

## 2022-06-23 VITALS
SYSTOLIC BLOOD PRESSURE: 124 MMHG | DIASTOLIC BLOOD PRESSURE: 80 MMHG | WEIGHT: 193.4 LBS | RESPIRATION RATE: 16 BRPM | HEART RATE: 70 BPM | BODY MASS INDEX: 31.08 KG/M2 | HEIGHT: 66 IN | TEMPERATURE: 98.4 F | OXYGEN SATURATION: 99 %

## 2022-06-23 DIAGNOSIS — G43.709 CHRONIC MIGRAINE WITHOUT AURA WITHOUT STATUS MIGRAINOSUS, NOT INTRACTABLE: ICD-10-CM

## 2022-06-23 DIAGNOSIS — Z00.00 ANNUAL PHYSICAL EXAM: Primary | ICD-10-CM

## 2022-06-23 PROCEDURE — 3725F SCREEN DEPRESSION PERFORMED: CPT | Performed by: FAMILY MEDICINE

## 2022-06-23 PROCEDURE — 3008F BODY MASS INDEX DOCD: CPT | Performed by: FAMILY MEDICINE

## 2022-06-23 PROCEDURE — 1036F TOBACCO NON-USER: CPT | Performed by: FAMILY MEDICINE

## 2022-06-23 PROCEDURE — 99395 PREV VISIT EST AGE 18-39: CPT | Performed by: FAMILY MEDICINE

## 2022-06-23 RX ORDER — ELETRIPTAN HYDROBROMIDE 20 MG/1
20 TABLET, FILM COATED ORAL ONCE AS NEEDED
Qty: 6 TABLET | Refills: 0 | Status: SHIPPED | OUTPATIENT
Start: 2022-06-23

## 2022-06-23 NOTE — PATIENT INSTRUCTIONS

## 2022-06-23 NOTE — PROGRESS NOTES
[address] [facility] [address]    NAME: Farzaneh Abdullahi  AGE: 42 y o  SEX: female  : 1985     DATE: 2022     Assessment and Plan:     Problem List Items Addressed This Visit        Cardiovascular and Mediastinum    Migraine    Relevant Medications    eletriptan (RELPAX) 20 MG tablet      Other Visit Diagnoses     Annual physical exam    -  Primary    Relevant Orders    CBC and differential    Comprehensive metabolic panel    Lipid Panel with Direct LDL reflex    TSH, 3rd generation with Free T4 reflex    Hepatitis C antibody          Immunizations and preventive care screenings were discussed with patient today  Appropriate education was printed on patient's after visit summary  Counseling:  Alcohol/drug use: discussed moderation in alcohol intake, the recommendations for healthy alcohol use, and avoidance of illicit drug use  Dental Health: discussed importance of regular tooth brushing, flossing, and dental visits  Injury prevention: discussed safety/seat belts, safety helmets, smoke detectors, carbon dioxide detectors, and smoking near bedding or upholstery  Sexual health: discussed sexually transmitted diseases, partner selection, use of condoms, avoidance of unintended pregnancy, and contraceptive alternatives  Exercise: the importance of regular exercise/physical activity was discussed  Recommend exercise 3-5 times per week for at least 30 minutes  BMI Counseling: Body mass index is 31 23 kg/m²  The BMI is above normal  Nutrition recommendations include decreasing portion sizes and encouraging healthy choices of fruits and vegetables  Exercise recommendations include moderate physical activity 150 minutes/week  No pharmacotherapy was ordered  Rationale for BMI follow-up plan is due to patient being overweight or obese  Depression Screening and Follow-up Plan: Patient was screened for depression during today's encounter  They screened negative with a PHQ-2 score of 0  No follow-ups on file  Chief Complaint:     Chief Complaint   Patient presents with    Physical Exam     Patient is here today for an annual exam       History of Present Illness:     Adult Annual Physical   Patient here for a comprehensive physical exam  The patient reports no problems  Diet and Physical Activity  Diet/Nutrition: well balanced diet and consuming 3-5 servings of fruits/vegetables daily  Exercise: moderate cardiovascular exercise  Depression Screening  PHQ-2/9 Depression Screening    Little interest or pleasure in doing things: 0 - not at all  Feeling down, depressed, or hopeless: 0 - not at all  PHQ-2 Score: 0  PHQ-2 Interpretation: Negative depression screen       General Health  Sleep: sleeps well  Hearing: normal - bilateral   Vision: goes for regular eye exams  Dental: regular dental visits and brushes teeth twice daily  /GYN Health  Last menstrual period: regular periods  Light periods   Contraceptive method: IUD placement  History of STDs?: no      Review of Systems:     Review of Systems   Constitutional: Negative  HENT: Negative  Eyes: Negative  Respiratory: Negative  Cardiovascular: Negative  Gastrointestinal: Negative  Endocrine: Negative  Genitourinary: Negative  Musculoskeletal: Negative  Skin: Negative  Allergic/Immunologic: Negative  Neurological: Negative  Hematological: Negative  Psychiatric/Behavioral: Negative         Past Medical History:     Past Medical History:   Diagnosis Date    Asthma     seasonal resolved    Breast disorder     cyst benign    Intramural leiomyoma of uterus     Migraine     Seasonal allergies     Varicella     childhood      Past Surgical History:     Past Surgical History:   Procedure Laterality Date    NASAL SEPTUM SURGERY      TOOTH EXTRACTION        Social History:     Social History     Socioeconomic History    Marital status: /Civil Union     Spouse name: None    Number of children: None    Years of education: None    Highest education level: None   Occupational History    None   Tobacco Use    Smoking status: Never Smoker    Smokeless tobacco: Never Used   Vaping Use    Vaping Use: Never used   Substance and Sexual Activity    Alcohol use: Yes     Alcohol/week: 1 0 - 2 0 standard drink     Types: 1 - 2 Glasses of wine per week     Comment: social    Drug use: No    Sexual activity: Yes     Partners: Male     Birth control/protection: I U D       Comment:    Other Topics Concern    None   Social History Narrative    Caffeine use    Uses safety equipment, seat belts     Social Determinants of Health     Financial Resource Strain: Not on file   Food Insecurity: Not on file   Transportation Needs: Not on file   Physical Activity: Not on file   Stress: Not on file   Social Connections: Not on file   Intimate Partner Violence: Not on file   Housing Stability: Not on file      Family History:     Family History   Problem Relation Age of Onset    Hyperlipidemia Mother     Hypertension Mother     Lung cancer Father     Cancer Father     Heart disease Maternal Grandmother     Hypertension Maternal Grandmother     Osteoporosis Maternal Grandmother     Heart disease Maternal Grandfather     Heart attack Paternal Grandfather     Heart disease Paternal Grandfather     Other Maternal Aunt         breast disorders    Alcohol abuse Paternal Uncle     Pancreatic cancer Paternal Uncle     Cancer Paternal Uncle     Breast cancer Family     No Known Problems Sister     No Known Problems Brother       Current Medications:     Current Outpatient Medications   Medication Sig Dispense Refill    eletriptan (RELPAX) 20 MG tablet Take 1 tablet (20 mg total) by mouth once as needed for migraine for up to 1 dose may repeat in 2 hours if necessary 6 tablet 0    levonorgestrel (MIRENA) 20 MCG/24HR IUD 1 each by Intrauterine route       No current facility-administered medications for this visit  Allergies: Allergies   Allergen Reactions    Other Allergic Rhinitis     environmental    Pollen Extract Other (See Comments)     Sinusitis  Sinusitis      Physical Exam:     /80 (BP Location: Left arm, Patient Position: Sitting, Cuff Size: Adult)   Pulse 70   Temp 98 4 °F (36 9 °C) (Tympanic)   Resp 16   Ht 5' 5 98" (1 676 m)   Wt 87 7 kg (193 lb 6 4 oz)   SpO2 99%   BMI 31 23 kg/m²     Physical Exam  Constitutional:       Appearance: Normal appearance  She is well-developed  HENT:      Head: Normocephalic and atraumatic  Right Ear: Tympanic membrane normal       Left Ear: Tympanic membrane normal       Nose: Nose normal       Mouth/Throat:      Mouth: Mucous membranes are moist    Eyes:      Pupils: Pupils are equal, round, and reactive to light  Cardiovascular:      Rate and Rhythm: Normal rate and regular rhythm  Pulses: Normal pulses  Heart sounds: Normal heart sounds  Pulmonary:      Effort: Pulmonary effort is normal  No respiratory distress  Breath sounds: Normal breath sounds  No wheezing  Abdominal:      General: Bowel sounds are normal       Palpations: Abdomen is soft  Musculoskeletal:         General: No deformity  Normal range of motion  Cervical back: Normal range of motion and neck supple  Skin:     General: Skin is warm  Capillary Refill: Capillary refill takes less than 2 seconds  Neurological:      General: No focal deficit present  Mental Status: She is alert and oriented to person, place, and time     Psychiatric:         Mood and Affect: Mood normal          Behavior: Behavior normal           Colon MD Ivan   [address] [facility]

## 2022-10-02 ENCOUNTER — OFFICE VISIT (OUTPATIENT)
Dept: URGENT CARE | Age: 37
End: 2022-10-02
Payer: COMMERCIAL

## 2022-10-02 VITALS
OXYGEN SATURATION: 98 % | HEART RATE: 83 BPM | RESPIRATION RATE: 16 BRPM | SYSTOLIC BLOOD PRESSURE: 137 MMHG | DIASTOLIC BLOOD PRESSURE: 65 MMHG

## 2022-10-02 DIAGNOSIS — R05.1 ACUTE COUGH: Primary | ICD-10-CM

## 2022-10-02 PROCEDURE — G0382 LEV 3 HOSP TYPE B ED VISIT: HCPCS

## 2022-10-02 RX ORDER — ALBUTEROL SULFATE 90 UG/1
2 AEROSOL, METERED RESPIRATORY (INHALATION) EVERY 6 HOURS PRN
Qty: 8.5 G | Refills: 0 | Status: SHIPPED | OUTPATIENT
Start: 2022-10-02

## 2022-10-02 RX ORDER — ALBUTEROL SULFATE 90 UG/1
2 AEROSOL, METERED RESPIRATORY (INHALATION) EVERY 6 HOURS PRN
Qty: 8.5 G | Refills: 0 | Status: SHIPPED | OUTPATIENT
Start: 2022-10-02 | End: 2022-10-02

## 2022-10-02 RX ORDER — PREDNISONE 20 MG/1
20 TABLET ORAL 2 TIMES DAILY WITH MEALS
Qty: 10 TABLET | Refills: 0 | Status: SHIPPED | OUTPATIENT
Start: 2022-10-02 | End: 2022-10-07

## 2022-10-02 RX ORDER — PREDNISONE 20 MG/1
20 TABLET ORAL 2 TIMES DAILY WITH MEALS
Qty: 10 TABLET | Refills: 0 | Status: SHIPPED | OUTPATIENT
Start: 2022-10-02 | End: 2022-10-02

## 2022-10-02 RX ORDER — AZITHROMYCIN 250 MG/1
TABLET, FILM COATED ORAL
Qty: 6 TABLET | Refills: 0 | Status: SHIPPED | OUTPATIENT
Start: 2022-10-02 | End: 2022-10-02

## 2022-10-02 RX ORDER — AZITHROMYCIN 250 MG/1
TABLET, FILM COATED ORAL
Qty: 6 TABLET | Refills: 0 | Status: SHIPPED | OUTPATIENT
Start: 2022-10-02 | End: 2022-10-06

## 2022-10-02 NOTE — PROGRESS NOTES
[facility]        NAME: Sandi Degroot is a 42 y o  female  : 1985    MRN: [id number]  DATE: 2022  TIME: 5:20 PM    Assessment and Plan   Acute cough [R05 1]  1  Acute cough  predniSONE 20 mg tablet    azithromycin (ZITHROMAX) 250 mg tablet    albuterol (ProAir HFA) 90 mcg/act inhaler    75-year-old female presents for evaluation of cough over the past 1-2 weeks  Will trial prednisone, albuterol  Patient advised that she may monitor symptoms for 24-48 hours before starting antibiotic if desired  Follow-up with primary care provider if symptoms do not resolve within 1-2 weeks  Patient Instructions   Acute Cough   WHAT YOU NEED TO KNOW:   An acute cough can last up to 3 weeks  Common causes of an acute cough include a cold, allergies, or a lung infection  DISCHARGE INSTRUCTIONS:   Return to the emergency department if:   · You have trouble breathing or feel short of breath      · You cough up blood, or you see blood in your mucus      · You faint or feel weak or dizzy      · You have chest pain when you cough or take a deep breath      · You have new wheezing      Contact your healthcare provider if:   · You have a fever      · Your cough lasts longer than 4 weeks      · Your symptoms do not improve with treatment      · You have questions or concerns about your condition or care      Medicines:   · Medicines  may be needed to stop the cough, decrease swelling in your airways, or help open your airways  Medicine may also be given to help you cough up mucus  Ask your healthcare provider what over-the-counter medicines you can take  If you have an infection caused by bacteria, you may need antibiotics      · Take your medicine as directed  Contact your healthcare provider if you think your medicine is not helping or if you have side effects  Tell him or her if you are allergic to any medicine  Keep a list of the medicines, vitamins, and herbs you take   Include the amounts, and when and why you take them  Bring the list or the pill bottles to follow-up visits  Carry your medicine list with you in case of an emergency      Manage your symptoms:   · Do not smoke and stay away from others who smoke  Nicotine and other chemicals in cigarettes and cigars can cause lung damage and make your cough worse  Ask your healthcare provider for information if you currently smoke and need help to quit  E-cigarettes or smokeless tobacco still contain nicotine  Talk to your healthcare provider before you use these products      · Drink extra liquids as directed  Liquids will help thin and loosen mucus so you can cough it up  Liquids will also help prevent dehydration  Examples of good liquids to drink include water, fruit juice, and broth  Do not drink liquids that contain caffeine  Caffeine can increase your risk for dehydration  Ask your healthcare provider how much liquid to drink each day      · Rest as directed  Do not do activities that make your cough worse, such as exercise      · Use a humidifier or vaporizer  Use a cool mist humidifier or a vaporizer to increase air moisture in your home  This may make it easier for you to breathe and help decrease your cough      · Eat 2 to 5 mL of honey 2 times each day  Honey can help thin mucus and decrease your cough      · Use cough drops or lozenges  These can help decrease throat irritation and your cough      Follow up with your healthcare provider as directed:  Write down your questions so you remember to ask them during your visits  © Copyright [employer] 2022 Information is for End User's use only and may not be sold, redistributed or otherwise used for commercial purposes  All illustrations and images included in CareNotes® are the copyrighted property of [employer]  or [employer]   The above information is an  only  It is not intended as medical advice for individual conditions or treatments   Talk to your doctor, nurse or pharmacist before following any medical regimen to see if it is safe and effective for you        Follow up with PCP in 3-5 days  Proceed to  ER if symptoms worsen  Chief Complaint   No chief complaint on file  History of Present Illness       Patient is a 38-year-old female with no major medical history presents for evaluation of cough over the past 1 5 weeks  She reports that symptoms began with body aches and low-grade fevers up to 100, which have since resolved  She reports the cough is worse at night and is causing her to have a sore throat  She denies fever, chest pain, shortness a breath, palpitations, nausea/vomiting/diarrhea    An at home COVID test was negative      Review of Systems   Review of Systems      Current Medications       Current Outpatient Medications:     albuterol (ProAir HFA) 90 mcg/act inhaler, Inhale 2 puffs every 6 (six) hours as needed for wheezing or shortness of breath, Disp: 8 5 g, Rfl: 0    azithromycin (ZITHROMAX) 250 mg tablet, Take 2 tablets today then 1 tablet daily x 4 days, Disp: 6 tablet, Rfl: 0    predniSONE 20 mg tablet, Take 1 tablet (20 mg total) by mouth 2 (two) times a day with meals for 5 days, Disp: 10 tablet, Rfl: 0    eletriptan (RELPAX) 20 MG tablet, Take 1 tablet (20 mg total) by mouth once as needed for migraine for up to 1 dose may repeat in 2 hours if necessary, Disp: 6 tablet, Rfl: 0    levonorgestrel (MIRENA) 20 MCG/24HR IUD, 1 each by Intrauterine route, Disp: , Rfl:     Current Allergies     Allergies as of 10/02/2022 - Reviewed 06/23/2022   Allergen Reaction Noted    Other Allergic Rhinitis 12/05/2013    Pollen extract Other (See Comments) 01/07/2019            The following portions of the patient's history were reviewed and updated as appropriate: allergies, current medications, past family history, past medical history, past social history, past surgical history and problem list      Past Medical History:   Diagnosis Date  Asthma     seasonal resolved    Breast disorder     cyst benign    Intramural leiomyoma of uterus     Migraine     Seasonal allergies     Varicella     childhood       Past Surgical History:   Procedure Laterality Date    NASAL SEPTUM SURGERY      TOOTH EXTRACTION         Family History   Problem Relation Age of Onset    Hyperlipidemia Mother     Hypertension Mother     Lung cancer Father     Cancer Father     Heart disease Maternal Grandmother     Hypertension Maternal Grandmother     Osteoporosis Maternal Grandmother     Heart disease Maternal Grandfather     Heart attack Paternal Grandfather     Heart disease Paternal Grandfather     Other Maternal Aunt         breast disorders    Alcohol abuse Paternal Uncle     Pancreatic cancer Paternal Uncle     Cancer Paternal Uncle     Breast cancer Family     No Known Problems Sister     No Known Problems Brother          Medications have been verified  Objective   There were no vitals taken for this visit  Physical Exam     Physical Exam  Vitals and nursing note reviewed  Constitutional:       General: She is not in acute distress  Appearance: Normal appearance  She is not ill-appearing  Interventions: She is not intubated  HENT:      Head: Normocephalic and atraumatic  Right Ear: Tympanic membrane, ear canal and external ear normal  There is no impacted cerumen  Left Ear: Tympanic membrane, ear canal and external ear normal  There is no impacted cerumen  Nose: Nose normal  No congestion or rhinorrhea  Mouth/Throat:      Mouth: Mucous membranes are moist       Tongue: No lesions  Tongue does not deviate from midline  Palate: No mass and lesions  Pharynx: Uvula midline  No pharyngeal swelling, oropharyngeal exudate, posterior oropharyngeal erythema or uvula swelling  Tonsils: No tonsillar exudate or tonsillar abscesses  2+ on the right  2+ on the left     Eyes:      Extraocular Movements: Extraocular movements intact  Conjunctiva/sclera: Conjunctivae normal       Pupils: Pupils are equal, round, and reactive to light  Cardiovascular:      Rate and Rhythm: Normal rate and regular rhythm  Pulses: Normal pulses  Heart sounds: Normal heart sounds, S1 normal and S2 normal  Heart sounds not distant  No murmur heard  No friction rub  No gallop  Pulmonary:      Effort: Pulmonary effort is normal  No tachypnea, bradypnea, accessory muscle usage, prolonged expiration, respiratory distress or retractions  She is not intubated  Breath sounds: No stridor, decreased air movement or transmitted upper airway sounds  Examination of the right-upper field reveals wheezing  Examination of the left-upper field reveals wheezing  Wheezing present  No decreased breath sounds, rhonchi or rales  Comments: Faint expiratory wheezing bilateral upper lobes  Chest:      Chest wall: No tenderness  Abdominal:      General: Bowel sounds are normal       Palpations: Abdomen is soft  Tenderness: There is no abdominal tenderness  There is no guarding or rebound  Musculoskeletal:         General: Normal range of motion  Cervical back: Normal range of motion and neck supple  No rigidity or tenderness  Lymphadenopathy:      Cervical: No cervical adenopathy  Skin:     General: Skin is warm and dry  Capillary Refill: Capillary refill takes less than 2 seconds  Neurological:      General: No focal deficit present  Mental Status: She is alert and oriented to person, place, and time  Cranial Nerves: No cranial nerve deficit     Psychiatric:         Mood and Affect: Mood normal          Behavior: Behavior normal

## 2022-11-04 LAB
ALBUMIN SERPL-MCNC: 4 G/DL (ref 3.6–5.1)
ALBUMIN/GLOB SERPL: 1.7 (CALC) (ref 1–2.5)
ALP SERPL-CCNC: 33 U/L (ref 31–125)
ALT SERPL-CCNC: 15 U/L (ref 6–29)
AST SERPL-CCNC: 17 U/L (ref 10–30)
BASOPHILS # BLD AUTO: 17 CELLS/UL (ref 0–200)
BASOPHILS NFR BLD AUTO: 0.3 %
BILIRUB SERPL-MCNC: 0.5 MG/DL (ref 0.2–1.2)
BUN SERPL-MCNC: 10 MG/DL (ref 7–25)
BUN/CREAT SERPL: NORMAL (CALC) (ref 6–22)
CALCIUM SERPL-MCNC: 9.3 MG/DL (ref 8.6–10.2)
CHLORIDE SERPL-SCNC: 106 MMOL/L (ref 98–110)
CHOLEST SERPL-MCNC: 192 MG/DL
CHOLEST/HDLC SERPL: 3.3 (CALC)
CO2 SERPL-SCNC: 26 MMOL/L (ref 20–32)
CREAT SERPL-MCNC: 0.79 MG/DL (ref 0.5–0.97)
EOSINOPHIL # BLD AUTO: 162 CELLS/UL (ref 15–500)
EOSINOPHIL NFR BLD AUTO: 2.8 %
ERYTHROCYTE [DISTWIDTH] IN BLOOD BY AUTOMATED COUNT: 12.9 % (ref 11–15)
GFR/BSA.PRED SERPLBLD CYS-BASED-ARV: 98 ML/MIN/1.73M2
GLOBULIN SER CALC-MCNC: 2.4 G/DL (CALC) (ref 1.9–3.7)
GLUCOSE SERPL-MCNC: 90 MG/DL (ref 65–99)
HCT VFR BLD AUTO: 42.1 % (ref 35–45)
HCV AB S/CO SERPL IA: 0.05
HCV AB SERPL QL IA: NORMAL
HDLC SERPL-MCNC: 58 MG/DL
HGB BLD-MCNC: 14.3 G/DL (ref 11.7–15.5)
LDLC SERPL CALC-MCNC: 117 MG/DL (CALC)
LYMPHOCYTES # BLD AUTO: 3318 CELLS/UL (ref 850–3900)
LYMPHOCYTES NFR BLD AUTO: 57.2 %
MCH RBC QN AUTO: 30.6 PG (ref 27–33)
MCHC RBC AUTO-ENTMCNC: 34 G/DL (ref 32–36)
MCV RBC AUTO: 90 FL (ref 80–100)
MONOCYTES # BLD AUTO: 383 CELLS/UL (ref 200–950)
MONOCYTES NFR BLD AUTO: 6.6 %
NEUTROPHILS # BLD AUTO: 1920 CELLS/UL (ref 1500–7800)
NEUTROPHILS NFR BLD AUTO: 33.1 %
NONHDLC SERPL-MCNC: 134 MG/DL (CALC)
PLATELET # BLD AUTO: 305 THOUSAND/UL (ref 140–400)
PMV BLD REES-ECKER: 10.4 FL (ref 7.5–12.5)
POTASSIUM SERPL-SCNC: 4.2 MMOL/L (ref 3.5–5.3)
PROT SERPL-MCNC: 6.4 G/DL (ref 6.1–8.1)
RBC # BLD AUTO: 4.68 MILLION/UL (ref 3.8–5.1)
SODIUM SERPL-SCNC: 138 MMOL/L (ref 135–146)
TRIGL SERPL-MCNC: 71 MG/DL
TSH SERPL-ACNC: 1.97 MIU/L
WBC # BLD AUTO: 5.8 THOUSAND/UL (ref 3.8–10.8)

## 2023-01-18 ENCOUNTER — ANNUAL EXAM (OUTPATIENT)
Dept: OBGYN CLINIC | Facility: CLINIC | Age: 38
End: 2023-01-18

## 2023-01-18 VITALS
WEIGHT: 187 LBS | HEIGHT: 65 IN | BODY MASS INDEX: 31.16 KG/M2 | SYSTOLIC BLOOD PRESSURE: 124 MMHG | DIASTOLIC BLOOD PRESSURE: 70 MMHG

## 2023-01-18 DIAGNOSIS — Z12.31 ENCOUNTER FOR SCREENING MAMMOGRAM FOR MALIGNANT NEOPLASM OF BREAST: ICD-10-CM

## 2023-01-18 DIAGNOSIS — Z01.419 ENCNTR FOR GYN EXAM (GENERAL) (ROUTINE) W/O ABN FINDINGS: Primary | ICD-10-CM

## 2023-01-18 DIAGNOSIS — Z11.51 SCREENING FOR HUMAN PAPILLOMAVIRUS (HPV): ICD-10-CM

## 2023-01-18 NOTE — PROGRESS NOTES
Carola Krishnan  1985      CC:  Yearly exam    S:  42 y o  female here for yearly exam  Her cycles are light on the Mirena  Sexual activity: She is sexually active without pain, bleeding or dryness  Contraception: She uses Mirena for contraception  She is still trying to convince her  to have a vasectomy - discussed 8 year indication for Mirena so she has a little bit of time  Last Pap 10/18/2018 - normal/negative HPV  Last Mammo 2/27/2015 - BIRAD-3; follow up 10/12/2015 - BIRAD-1    We reviewed [facility] guidelines for Pap testing today  Current Outpatient Medications:   •  levonorgestrel (MIRENA) 20 MCG/24HR IUD, 1 each by Intrauterine route, Disp: , Rfl:   •  albuterol (ProAir HFA) 90 mcg/act inhaler, Inhale 2 puffs every 6 (six) hours as needed for wheezing or shortness of breath (Patient not taking: Reported on 1/18/2023), Disp: 8 5 g, Rfl: 0  •  eletriptan (RELPAX) 20 MG tablet, Take 1 tablet (20 mg total) by mouth once as needed for migraine for up to 1 dose may repeat in 2 hours if necessary (Patient not taking: Reported on 1/18/2023), Disp: 6 tablet, Rfl: 0  Social History     Socioeconomic History   • Marital status: /Civil Union     Spouse name: Not on file   • Number of children: Not on file   • Years of education: Not on file   • Highest education level: Not on file   Occupational History   • Not on file   Tobacco Use   • Smoking status: Never   • Smokeless tobacco: Never   Vaping Use   • Vaping Use: Never used   Substance and Sexual Activity   • Alcohol use: Yes     Alcohol/week: 1 0 - 2 0 standard drink     Types: 1 - 2 Glasses of wine per week     Comment: social   • Drug use: No   • Sexual activity: Yes     Partners: Male     Birth control/protection: I U D       Comment:    Other Topics Concern   • Not on file   Social History Narrative    Caffeine use    Uses safety equipment, seat belts     Social Determinants of Health     Financial Resource Strain: Not on file   Food Insecurity: Not on file   Transportation Needs: Not on file   Physical Activity: Not on file   Stress: Not on file   Social Connections: Not on file   Intimate Partner Violence: Not on file   Housing Stability: Not on file     Family History   Problem Relation Age of Onset   • Hyperlipidemia Mother    • Hypertension Mother    • Lung cancer Father    • Cancer Father    • Heart disease Maternal Grandmother    • Hypertension Maternal Grandmother    • Osteoporosis Maternal Grandmother    • Heart disease Maternal Grandfather    • Heart attack Paternal Grandfather    • Heart disease Paternal Grandfather    • Other Maternal Aunt         breast disorders   • Alcohol abuse Paternal Uncle    • Pancreatic cancer Paternal Uncle    • Cancer Paternal Uncle    • Breast cancer Family    • No Known Problems Sister    • No Known Problems Brother       Past Medical History:   Diagnosis Date   • Asthma     seasonal resolved   • Breast disorder     cyst benign   • Intramural leiomyoma of uterus    • Migraine    • Seasonal allergies    • Varicella     childhood        Review of Systems   Respiratory: Negative  Cardiovascular: Negative  Gastrointestinal: Negative for constipation and diarrhea  Genitourinary: Negative for difficulty urinating, pelvic pain, vaginal bleeding, vaginal discharge, itching or odor  O:  Blood pressure 124/70, height 5' 4 5" (1 638 m), weight 84 8 kg (187 lb), last menstrual period 01/09/2023, currently breastfeeding  Patient appears well and is not in distress  Neck is supple without masses  Breasts are symmetrical without mass, tenderness, nipple discharge, skin changes or adenopathy  Abdomen is soft and nontender without masses  External genitals are normal without lesions or rashes  Urethral meatus and urethra are normal  Bladder is normal to palpation  Vagina is normal without discharge or bleeding  Cervix is normal without discharge or lesion   +IUD strings  Uterus is normal, mobile, nontender without palpable mass  Adnexa are normal, nontender, without palpable mass  A:   Yearly exam      P:   Pap with HPV done - will call with results   Mammo slip provided - discussed frequency and shared decision-making    RTO one year for yearly exam or sooner as needed

## 2023-01-26 LAB
LAB AP GYN PRIMARY INTERPRETATION: ABNORMAL
LAB AP LMP: ABNORMAL
Lab: ABNORMAL
PATH INTERP SPEC-IMP: ABNORMAL

## 2023-06-30 ENCOUNTER — OFFICE VISIT (OUTPATIENT)
Dept: FAMILY MEDICINE CLINIC | Facility: CLINIC | Age: 38
End: 2023-06-30
Payer: COMMERCIAL

## 2023-06-30 VITALS
OXYGEN SATURATION: 97 % | SYSTOLIC BLOOD PRESSURE: 130 MMHG | BODY MASS INDEX: 27.13 KG/M2 | HEART RATE: 82 BPM | TEMPERATURE: 98.2 F | WEIGHT: 168.8 LBS | DIASTOLIC BLOOD PRESSURE: 70 MMHG | RESPIRATION RATE: 18 BRPM | HEIGHT: 66 IN

## 2023-06-30 DIAGNOSIS — G43.709 CHRONIC MIGRAINE WITHOUT AURA WITHOUT STATUS MIGRAINOSUS, NOT INTRACTABLE: ICD-10-CM

## 2023-06-30 DIAGNOSIS — Z00.00 ANNUAL PHYSICAL EXAM: Primary | ICD-10-CM

## 2023-06-30 RX ORDER — RIMEGEPANT SULFATE 75 MG/75MG
TABLET, ORALLY DISINTEGRATING ORAL
Qty: 10 TABLET | Refills: 0 | Status: SHIPPED | OUTPATIENT
Start: 2023-06-30

## 2023-06-30 NOTE — PROGRESS NOTES
[address]    NAME: Elena King  AGE: 34 y o  SEX: female  : 1985     DATE: 2023     Assessment and Plan:     Problem List Items Addressed This Visit        Cardiovascular and Mediastinum    Migraine    Relevant Medications    rimegepant sulfate (Nurtec) 75 mg TBDP   Other Visit Diagnoses     Annual physical exam    -  Primary    Relevant Orders    CBC and differential    Comprehensive metabolic panel    Lipid panel    TSH, 3rd generation with Free T4 reflex          Immunizations and preventive care screenings were discussed with patient today  Appropriate education was printed on patient's after visit summary  Counseling:  Alcohol/drug use: discussed moderation in alcohol intake, the recommendations for healthy alcohol use, and avoidance of illicit drug use  Dental Health: discussed importance of regular tooth brushing, flossing, and dental visits  Injury prevention: discussed safety/seat belts, safety helmets, smoke detectors, carbon dioxide detectors, and smoking near bedding or upholstery  Sexual health: discussed sexually transmitted diseases, partner selection, use of condoms, avoidance of unintended pregnancy, and contraceptive alternatives  Exercise: the importance of regular exercise/physical activity was discussed  Recommend exercise 3-5 times per week for at least 30 minutes  BMI Counseling: Body mass index is 27 59 kg/m²  The BMI is above normal  Nutrition recommendations include decreasing portion sizes and encouraging healthy choices of fruits and vegetables  Exercise recommendations include moderate physical activity 150 minutes/week  No pharmacotherapy was ordered  Rationale for BMI follow-up plan is due to patient being overweight or obese  Depression Screening and Follow-up Plan: Patient was screened for depression during today's encounter   They screened negative with a PHQ-2 score of 0         No follow-ups on file  Chief Complaint:     Chief Complaint   Patient presents with   • Physical Exam     Patient being seen for an annual Physical Exam      History of Present Illness:     Adult Annual Physical   Patient here for a comprehensive physical exam  The patient reports no problems  Happening once a month with migraine     Diet and Physical Activity  Diet/Nutrition: well balanced diet and consuming 3-5 servings of fruits/vegetables daily  Exercise: moderate cardiovascular exercise  Depression Screening  PHQ-2/9 Depression Screening    Little interest or pleasure in doing things: 0 - not at all  Feeling down, depressed, or hopeless: 0 - not at all  PHQ-2 Score: 0  PHQ-2 Interpretation: Negative depression screen       General Health  Sleep: sleeps well and gets 7-8 hours of sleep on average  Hearing: normal - bilateral   Vision: most recent eye exam <1 year ago  Dental: regular dental visits and brushes teeth twice daily  /GYN Health  Patient is: premenopausal  Last menstrual period: regular periods  Contraceptive method: IUD placement  Review of Systems:     Review of Systems   Constitutional: Negative  HENT: Negative  Eyes: Negative  Respiratory: Negative  Cardiovascular: Negative  Gastrointestinal: Negative  Endocrine: Negative  Genitourinary: Negative  Musculoskeletal: Negative  Skin: Negative  Allergic/Immunologic: Negative  Neurological: Negative  Hematological: Negative  Psychiatric/Behavioral: Negative         Past Medical History:     Past Medical History:   Diagnosis Date   • Asthma     seasonal resolved   • Breast disorder     cyst benign   • Intramural leiomyoma of uterus    • Migraine    • Seasonal allergies    • Varicella     childhood      Past Surgical History:     Past Surgical History:   Procedure Laterality Date   • NASAL SEPTUM SURGERY     • TOOTH EXTRACTION        Social History:     Social History Socioeconomic History   • Marital status: /Civil Union     Spouse name: None   • Number of children: None   • Years of education: None   • Highest education level: None   Occupational History   • None   Tobacco Use   • Smoking status: Never   • Smokeless tobacco: Never   Vaping Use   • Vaping Use: Never used   Substance and Sexual Activity   • Alcohol use: Yes     Alcohol/week: 1 0 - 2 0 standard drink of alcohol     Types: 1 - 2 Glasses of wine per week     Comment: social   • Drug use: No   • Sexual activity: Yes     Partners: Male     Birth control/protection: I U D       Comment:    Other Topics Concern   • None   Social History Narrative    Caffeine use    Uses safety equipment, seat belts     Social Determinants of Health     Financial Resource Strain: Not on file   Food Insecurity: Not on file   Transportation Needs: Not on file   Physical Activity: Not on file   Stress: Not on file   Social Connections: Not on file   Intimate Partner Violence: Not on file   Housing Stability: Not on file      Family History:     Family History   Problem Relation Age of Onset   • Hyperlipidemia Mother    • Hypertension Mother    • Lung cancer Father    • Cancer Father    • Heart disease Maternal Grandmother    • Hypertension Maternal Grandmother    • Osteoporosis Maternal Grandmother    • Heart disease Maternal Grandfather    • Heart attack Paternal Grandfather    • Heart disease Paternal Grandfather    • Other Maternal Aunt         breast disorders   • Alcohol abuse Paternal Uncle    • Pancreatic cancer Paternal Uncle    • Cancer Paternal Uncle    • Breast cancer Family    • No Known Problems Sister    • No Known Problems Brother       Current Medications:     Current Outpatient Medications   Medication Sig Dispense Refill   • levonorgestrel (MIRENA) 20 MCG/24HR IUD 1 each by Intrauterine route     • rimegepant sulfate (Nurtec) 75 mg TBDP 1 tab daily as needed 10 tablet 0     No current facility-administered "medications for this visit  Allergies: Allergies   Allergen Reactions   • Other Allergic Rhinitis     environmental   • Pollen Extract Other (See Comments)     Sinusitis  Sinusitis      Physical Exam:     /70 (BP Location: Left arm, Patient Position: Sitting, Cuff Size: Standard)   Pulse 82   Temp 98 2 °F (36 8 °C) (Tympanic)   Resp 18   Ht 5' 5 59\" (1 666 m)   Wt 76 6 kg (168 lb 12 8 oz)   SpO2 97%   BMI 27 59 kg/m²     Physical Exam  Vitals and nursing note reviewed  Constitutional:       General: She is not in acute distress  Appearance: She is well-developed  HENT:      Head: Normocephalic and atraumatic  Right Ear: Tympanic membrane normal       Left Ear: Tympanic membrane normal       Nose: Nose normal       Mouth/Throat:      Mouth: Mucous membranes are moist    Eyes:      Conjunctiva/sclera: Conjunctivae normal    Cardiovascular:      Rate and Rhythm: Normal rate and regular rhythm  Heart sounds: No murmur heard  Pulmonary:      Effort: Pulmonary effort is normal  No respiratory distress  Breath sounds: Normal breath sounds  Abdominal:      Palpations: Abdomen is soft  Tenderness: There is no abdominal tenderness  Musculoskeletal:         General: No swelling  Cervical back: Neck supple  Skin:     General: Skin is warm and dry  Capillary Refill: Capillary refill takes less than 2 seconds  Neurological:      Mental Status: She is alert     Psychiatric:         Mood and Affect: Mood normal           Dalia Abdullahi MD  [id number] [facility]  "

## 2023-07-11 LAB
ALBUMIN SERPL-MCNC: 4.5 G/DL (ref 3.6–5.1)
ALBUMIN/GLOB SERPL: 2.4 (CALC) (ref 1–2.5)
ALP SERPL-CCNC: 26 U/L (ref 31–125)
ALT SERPL-CCNC: 10 U/L (ref 6–29)
AST SERPL-CCNC: 11 U/L (ref 10–30)
BASOPHILS # BLD AUTO: 31 CELLS/UL (ref 0–200)
BASOPHILS NFR BLD AUTO: 0.5 %
BILIRUB SERPL-MCNC: 0.4 MG/DL (ref 0.2–1.2)
BUN SERPL-MCNC: 11 MG/DL (ref 7–25)
BUN/CREAT SERPL: ABNORMAL (CALC) (ref 6–22)
CALCIUM SERPL-MCNC: 9.4 MG/DL (ref 8.6–10.2)
CHLORIDE SERPL-SCNC: 104 MMOL/L (ref 98–110)
CHOLEST SERPL-MCNC: 198 MG/DL
CHOLEST/HDLC SERPL: 3.2 (CALC)
CO2 SERPL-SCNC: 29 MMOL/L (ref 20–32)
CREAT SERPL-MCNC: 0.84 MG/DL (ref 0.5–0.99)
EOSINOPHIL # BLD AUTO: 43 CELLS/UL (ref 15–500)
EOSINOPHIL NFR BLD AUTO: 0.7 %
ERYTHROCYTE [DISTWIDTH] IN BLOOD BY AUTOMATED COUNT: 12 % (ref 11–15)
GFR/BSA.PRED SERPLBLD CYS-BASED-ARV: 90 ML/MIN/1.73M2
GLOBULIN SER CALC-MCNC: 1.9 G/DL (CALC) (ref 1.9–3.7)
GLUCOSE SERPL-MCNC: 86 MG/DL (ref 65–99)
HCT VFR BLD AUTO: 40.1 % (ref 35–45)
HDLC SERPL-MCNC: 61 MG/DL
HGB BLD-MCNC: 13.3 G/DL (ref 11.7–15.5)
LDLC SERPL CALC-MCNC: 121 MG/DL (CALC)
LYMPHOCYTES # BLD AUTO: 2995 CELLS/UL (ref 850–3900)
LYMPHOCYTES NFR BLD AUTO: 49.1 %
MCH RBC QN AUTO: 29.8 PG (ref 27–33)
MCHC RBC AUTO-ENTMCNC: 33.2 G/DL (ref 32–36)
MCV RBC AUTO: 89.9 FL (ref 80–100)
MONOCYTES # BLD AUTO: 348 CELLS/UL (ref 200–950)
MONOCYTES NFR BLD AUTO: 5.7 %
NEUTROPHILS # BLD AUTO: 2684 CELLS/UL (ref 1500–7800)
NEUTROPHILS NFR BLD AUTO: 44 %
NONHDLC SERPL-MCNC: 137 MG/DL (CALC)
PLATELET # BLD AUTO: 235 THOUSAND/UL (ref 140–400)
PMV BLD REES-ECKER: 11.2 FL (ref 7.5–12.5)
POTASSIUM SERPL-SCNC: 4.1 MMOL/L (ref 3.5–5.3)
PROT SERPL-MCNC: 6.4 G/DL (ref 6.1–8.1)
RBC # BLD AUTO: 4.46 MILLION/UL (ref 3.8–5.1)
SODIUM SERPL-SCNC: 139 MMOL/L (ref 135–146)
TRIGL SERPL-MCNC: 68 MG/DL
TSH SERPL-ACNC: 1.89 MIU/L
WBC # BLD AUTO: 6.1 THOUSAND/UL (ref 3.8–10.8)

## 2023-07-18 DIAGNOSIS — G43.709 CHRONIC MIGRAINE WITHOUT AURA WITHOUT STATUS MIGRAINOSUS, NOT INTRACTABLE: ICD-10-CM

## 2023-07-18 RX ORDER — RIMEGEPANT SULFATE 75 MG/75MG
TABLET, ORALLY DISINTEGRATING ORAL
Qty: 10 TABLET | Refills: 0 | Status: SHIPPED | OUTPATIENT
Start: 2023-07-18

## 2023-08-01 ENCOUNTER — TELEPHONE (OUTPATIENT)
Dept: FAMILY MEDICINE CLINIC | Facility: CLINIC | Age: 38
End: 2023-08-01

## 2023-08-01 NOTE — TELEPHONE ENCOUNTER
Left message that her Nurtec has been approved by he insurance and it is ready to be picked up at her local Pharmacy.

## 2023-11-14 ENCOUNTER — HOSPITAL ENCOUNTER (OUTPATIENT)
Dept: RADIOLOGY | Age: 38
Discharge: HOME | End: 2023-11-14
Payer: COMMERCIAL

## 2023-11-14 DIAGNOSIS — Z12.31 ENCOUNTER FOR SCREENING MAMMOGRAM FOR MALIGNANT NEOPLASM OF BREAST: ICD-10-CM

## 2023-11-14 PROCEDURE — 77063 BREAST TOMOSYNTHESIS BI: CPT

## 2023-11-14 PROCEDURE — 77067 SCR MAMMO BI INCL CAD: CPT

## 2024-01-23 ENCOUNTER — ANNUAL EXAM (OUTPATIENT)
Dept: OBGYN CLINIC | Facility: CLINIC | Age: 39
End: 2024-01-23
Payer: COMMERCIAL

## 2024-01-23 VITALS
HEIGHT: 65 IN | WEIGHT: 171 LBS | DIASTOLIC BLOOD PRESSURE: 84 MMHG | BODY MASS INDEX: 28.49 KG/M2 | SYSTOLIC BLOOD PRESSURE: 140 MMHG

## 2024-01-23 DIAGNOSIS — Z30.432 ENCOUNTER FOR IUD REMOVAL: ICD-10-CM

## 2024-01-23 DIAGNOSIS — Z01.419 ENCOUNTER FOR GYNECOLOGICAL EXAMINATION WITHOUT ABNORMAL FINDING: Primary | ICD-10-CM

## 2024-01-23 PROCEDURE — 99396 PREV VISIT EST AGE 40-64: CPT | Performed by: OBSTETRICS & GYNECOLOGY

## 2024-01-23 PROCEDURE — 58301 REMOVE INTRAUTERINE DEVICE: CPT | Performed by: OBSTETRICS & GYNECOLOGY

## 2024-01-23 NOTE — PROGRESS NOTES
Carola Navas  1985      CC:  Yearly exam    S:  38 y.o. female here for yearly exam. Her cycles are regular, light on the Mirena. She is aware that may see heavier periods after removal of IUD which she would like removed today as her  had his vasectomy and follow up semen analysis.      Sexual activity: She is sexually active without pain, bleeding or dryness.     Contraception: She uses Mirena and her 's vasectomy for contraception.     Last Pap 1/18/2023 - ASCUS, negative HPV  Last Mammo 11/14/2023 - BIRAD-1    We reviewed ASCCP guidelines for Pap testing today.         Current Outpatient Medications:     rimegepant sulfate (Nurtec) 75 mg TBDP, 1 tab daily as needed, Disp: 10 tablet, Rfl: 0    levonorgestrel (MIRENA) 20 MCG/24HR IUD, 1 each by Intrauterine route (Patient not taking: Reported on 1/23/2024), Disp: , Rfl:   Social History     Socioeconomic History    Marital status: /Civil Union     Spouse name: Not on file    Number of children: Not on file    Years of education: Not on file    Highest education level: Not on file   Occupational History    Not on file   Tobacco Use    Smoking status: Never    Smokeless tobacco: Never   Vaping Use    Vaping status: Never Used   Substance and Sexual Activity    Alcohol use: Yes     Alcohol/week: 1.0 - 2.0 standard drink of alcohol     Types: 1 - 2 Glasses of wine per week     Comment: social    Drug use: No    Sexual activity: Yes     Partners: Male     Birth control/protection: Male Sterilization     Comment:    Other Topics Concern    Not on file   Social History Narrative    Caffeine use    Uses safety equipment, seat belts     Social Determinants of Health     Financial Resource Strain: Not on file   Food Insecurity: Not on file   Transportation Needs: Not on file   Physical Activity: Not on file   Stress: Not on file   Social Connections: Not on file   Intimate Partner Violence: Not on file   Housing Stability: Not on file  "    Family History   Problem Relation Age of Onset    Hyperlipidemia Mother     Hypertension Mother     Lung cancer Father 54        smoker    No Known Problems Sister     No Known Problems Daughter     Heart disease Maternal Grandmother     Hypertension Maternal Grandmother     Osteoporosis Maternal Grandmother     Heart disease Maternal Grandfather     Heart attack Paternal Grandfather     Heart disease Paternal Grandfather     No Known Problems Brother     Other Maternal Aunt         breast disorders    Alcohol abuse Paternal Uncle     Pancreatic cancer Paternal Uncle 50    Breast cancer Family         age unknown    No Known Problems Son       Past Medical History:   Diagnosis Date    Asthma     seasonal resolved    Breast disorder     cyst benign    Intramural leiomyoma of uterus     Migraine     Seasonal allergies     Varicella     childhood        Review of Systems   Respiratory: Negative.    Cardiovascular: Negative.    Gastrointestinal: Negative for constipation and diarrhea.   Genitourinary: Negative for difficulty urinating, pelvic pain, vaginal bleeding, vaginal discharge, itching or odor.    O:  Blood pressure 140/84, height 5' 5.3\" (1.659 m), weight 77.6 kg (171 lb), last menstrual period 01/08/2024, currently breastfeeding.    Patient appears well and is not in distress  Neck is supple without masses  Breasts are symmetrical without mass, tenderness, nipple discharge, skin changes or adenopathy.   Abdomen is soft and nontender without masses.   External genitals are normal without lesions or rashes.  Urethral meatus and urethra are normal  Bladder is normal to palpation  Vagina is normal without discharge or bleeding.   Cervix is normal without discharge or lesion.  No string seen but IUD palpated with endobrush.    Uterus is normal, mobile, nontender without palpable mass.  Adnexa are normal, nontender, without palpable mass.     Iud removal    Date/Time: 1/23/2024 9:40 AM    Performed by: Yaima" MD Arleth  Authorized by: Yaima Reinoso MD  Universal Protocol:  Consent: Verbal consent obtained.  Risks and benefits: risks, benefits and alternatives were discussed  Consent given by: patient    Procedure:     Removed with no complications: yes (strings grasped with packing forceps)          A:   Yearly exam.     P:   Pap due 2026   Mammo scheduled     RTO one year for yearly exam or sooner as needed.

## 2024-08-14 ENCOUNTER — OFFICE VISIT (OUTPATIENT)
Dept: FAMILY MEDICINE CLINIC | Facility: CLINIC | Age: 39
End: 2024-08-14
Payer: COMMERCIAL

## 2024-08-14 VITALS
HEIGHT: 65 IN | RESPIRATION RATE: 16 BRPM | BODY MASS INDEX: 27.49 KG/M2 | TEMPERATURE: 98.7 F | HEART RATE: 88 BPM | WEIGHT: 165 LBS | OXYGEN SATURATION: 98 % | SYSTOLIC BLOOD PRESSURE: 118 MMHG | DIASTOLIC BLOOD PRESSURE: 70 MMHG

## 2024-08-14 DIAGNOSIS — G43.001 MIGRAINE WITHOUT AURA AND WITH STATUS MIGRAINOSUS, NOT INTRACTABLE: ICD-10-CM

## 2024-08-14 DIAGNOSIS — Z00.00 ANNUAL PHYSICAL EXAM: Primary | ICD-10-CM

## 2024-08-14 DIAGNOSIS — G43.709 CHRONIC MIGRAINE WITHOUT AURA WITHOUT STATUS MIGRAINOSUS, NOT INTRACTABLE: ICD-10-CM

## 2024-08-14 PROCEDURE — 99396 PREV VISIT EST AGE 40-64: CPT | Performed by: FAMILY MEDICINE

## 2024-08-14 RX ORDER — RIMEGEPANT SULFATE 75 MG/75MG
TABLET, ORALLY DISINTEGRATING ORAL
Qty: 10 TABLET | Refills: 0 | Status: SHIPPED | OUTPATIENT
Start: 2024-08-14

## 2024-08-14 RX ORDER — CHLORHEXIDINE GLUCONATE ORAL RINSE 1.2 MG/ML
SOLUTION DENTAL
COMMUNITY
Start: 2024-07-29

## 2024-08-14 NOTE — PROGRESS NOTES
Adult Annual Physical  Name: Carola Navas      : 1985      MRN: [id number]  Encounter Provider: Franny Turner MD  Encounter Date: 2024   Encounter department: [facility]    Assessment & Plan   1. Annual physical exam  -     CBC and differential; Future  -     Comprehensive metabolic panel  -     Lipid Panel With Direct LDL; Future  -     TSH, 3rd generation with Free T4 reflex  -     CBC and differential  -     Lipid Panel With Direct LDL  2. Migraine without aura and with status migrainosus, not intractable  Assessment & Plan:  Stable   Nurtec as needed  3. Chronic migraine without aura without status migrainosus, not intractable  Assessment & Plan:  Stable   Nurtec as needed  Orders:  -     rimegepant sulfate (Nurtec) 75 mg TBDP; 1 tab daily as needed    Immunizations and preventive care screenings were discussed with patient today. Appropriate education was printed on patient's after visit summary.    Counseling:  Alcohol/drug use: discussed moderation in alcohol intake, the recommendations for healthy alcohol use, and avoidance of illicit drug use.  Dental Health: discussed importance of regular tooth brushing, flossing, and dental visits.  Injury prevention: discussed safety/seat belts, safety helmets, smoke detectors, carbon dioxide detectors, and smoking near bedding or upholstery.  Sexual health: discussed sexually transmitted diseases, partner selection, use of condoms, avoidance of unintended pregnancy, and contraceptive alternatives.  Exercise: the importance of regular exercise/physical activity was discussed. Recommend exercise 3-5 times per week for at least 30 minutes.          History of Present Illness     Adult Annual Physical:  Patient presents for annual physical.     Diet and Physical Activity:  - Diet/Nutrition: well balanced diet and consuming 3-5 servings of fruits/vegetables daily.  - Exercise: moderate cardiovascular exercise.    Depression  Screening:  - PHQ-2 Score: 0    General Health:  - Sleep: sleeps well.  - Hearing: normal hearing bilateral ears.  - Vision: goes for regular eye exams.  - Dental: regular dental visits.    /GYN Health:  - Follows with GYN: no.   - Menopause: premenopausal.   - History of STDs: no  - Contraception: male partner had vasectomy.      Advanced Care Planning:  - Has an advanced directive?: no    - Has a durable medical POA?: no    - ACP document given to patient?: no      Review of Systems   Constitutional: Negative.    HENT: Negative.     Eyes: Negative.    Respiratory: Negative.     Cardiovascular: Negative.    Gastrointestinal: Negative.    Endocrine: Negative.    Genitourinary: Negative.    Musculoskeletal: Negative.    Skin: Negative.    Allergic/Immunologic: Negative.    Neurological: Negative.    Hematological: Negative.    Psychiatric/Behavioral: Negative.       Pertinent Medical History   none    Medical History Reviewed by provider this encounter:  Meds  Problems       Past Medical History   Past Medical History:   Diagnosis Date   • Allergic Childhood    Seasonal only   • Asthma     seasonal resolved   • Breast disorder     cyst benign   • Headache(784.0) 20s    Migraines   • Intramural leiomyoma of uterus    • Migraine    • Seasonal allergies    • Varicella     childhood     Past Surgical History:   Procedure Laterality Date   • EYE SURGERY  2015    Lasik   • FRACTURE SURGERY  1999    Deviated septum   • NASAL SEPTUM SURGERY     • TOOTH EXTRACTION       Family History   Problem Relation Age of Onset   • Hyperlipidemia Mother    • Hypertension Mother    • Lung cancer Father 54        smoker   • Cancer Father    • No Known Problems Sister    • No Known Problems Daughter    • Heart disease Maternal Grandmother    • Hypertension Maternal Grandmother    • Osteoporosis Maternal Grandmother    • Heart disease Maternal Grandfather    • Heart attack Paternal Grandfather    • Heart disease Paternal Grandfather    •  "No Known Problems Brother    • Other Maternal Aunt         breast disorders   • Alcohol abuse Paternal Uncle    • Pancreatic cancer Paternal Uncle 50   • Breast cancer Family         age unknown   • No Known Problems Son      Current Outpatient Medications on File Prior to Visit   Medication Sig Dispense Refill   • chlorhexidine (PERIDEX) 0.12 % solution SWISH 15 ML IN MOUTH FOR 30 SECONDS THEN SPIT TWICE DAILY X 5 DAYS     • [DISCONTINUED] rimegepant sulfate (Nurtec) 75 mg TBDP 1 tab daily as needed 10 tablet 0     No current facility-administered medications on file prior to visit.     Allergies   Allergen Reactions   • Other Allergic Rhinitis     environmental   • Pollen Extract Other (See Comments)     Sinusitis  Sinusitis      Current Outpatient Medications on File Prior to Visit   Medication Sig Dispense Refill   • chlorhexidine (PERIDEX) 0.12 % solution SWISH 15 ML IN MOUTH FOR 30 SECONDS THEN SPIT TWICE DAILY X 5 DAYS     • [DISCONTINUED] rimegepant sulfate (Nurtec) 75 mg TBDP 1 tab daily as needed 10 tablet 0     No current facility-administered medications on file prior to visit.      Social History     Tobacco Use   • Smoking status: Never     Passive exposure: Never   • Smokeless tobacco: Never   Vaping Use   • Vaping status: Never Used   Substance and Sexual Activity   • Alcohol use: Yes     Alcohol/week: 1.0 - 2.0 standard drink of alcohol     Types: 1 - 2 Glasses of wine per week     Comment: social   • Drug use: No   • Sexual activity: Yes     Partners: Male     Birth control/protection: Male Sterilization     Comment:        Objective     /70 (BP Location: Left arm, Patient Position: Sitting, Cuff Size: Standard)   Pulse 88   Temp 98.7 °F (37.1 °C) (Tympanic)   Resp 16   Ht 5' 5.47\" (1.663 m)   Wt 74.8 kg (165 lb)   SpO2 98%   BMI 27.06 kg/m²     Physical Exam  Vitals and nursing note reviewed.   Constitutional:       General: She is not in acute distress.     Appearance: Normal " appearance. She is well-developed.   HENT:      Head: Normocephalic and atraumatic.      Right Ear: Tympanic membrane normal.      Left Ear: Tympanic membrane normal.      Nose: Nose normal.      Mouth/Throat:      Mouth: Mucous membranes are moist.   Eyes:      Conjunctiva/sclera: Conjunctivae normal.   Cardiovascular:      Rate and Rhythm: Normal rate and regular rhythm.      Pulses: Normal pulses.      Heart sounds: Normal heart sounds. No murmur heard.  Pulmonary:      Effort: Pulmonary effort is normal. No respiratory distress.      Breath sounds: Normal breath sounds.   Abdominal:      Palpations: Abdomen is soft.      Tenderness: There is no abdominal tenderness.   Musculoskeletal:         General: No swelling.      Cervical back: Normal range of motion and neck supple.   Skin:     General: Skin is warm and dry.      Capillary Refill: Capillary refill takes less than 2 seconds.   Neurological:      General: No focal deficit present.      Mental Status: She is alert and oriented to person, place, and time.   Psychiatric:         Mood and Affect: Mood normal.

## 2024-09-10 LAB
ALBUMIN SERPL-MCNC: 4.4 G/DL (ref 3.6–5.1)
ALBUMIN/GLOB SERPL: 1.9 (CALC) (ref 1–2.5)
ALP SERPL-CCNC: 30 U/L (ref 31–125)
ALT SERPL-CCNC: 22 U/L (ref 6–29)
AST SERPL-CCNC: 19 U/L (ref 10–30)
BASOPHILS # BLD AUTO: 20 CELLS/UL (ref 0–200)
BASOPHILS NFR BLD AUTO: 0.4 %
BILIRUB SERPL-MCNC: 0.6 MG/DL (ref 0.2–1.2)
BUN SERPL-MCNC: 15 MG/DL (ref 7–25)
BUN/CREAT SERPL: ABNORMAL (CALC) (ref 6–22)
CALCIUM SERPL-MCNC: 9.3 MG/DL (ref 8.6–10.2)
CHLORIDE SERPL-SCNC: 105 MMOL/L (ref 98–110)
CHOLEST SERPL-MCNC: 173 MG/DL
CHOLEST/HDLC SERPL: 2.9 (CALC)
CO2 SERPL-SCNC: 27 MMOL/L (ref 20–32)
CREAT SERPL-MCNC: 0.88 MG/DL (ref 0.5–0.99)
EOSINOPHIL # BLD AUTO: 112 CELLS/UL (ref 15–500)
EOSINOPHIL NFR BLD AUTO: 2.2 %
ERYTHROCYTE [DISTWIDTH] IN BLOOD BY AUTOMATED COUNT: 12 % (ref 11–15)
GFR/BSA.PRED SERPLBLD CYS-BASED-ARV: 85 ML/MIN/1.73M2
GLOBULIN SER CALC-MCNC: 2.3 G/DL (CALC) (ref 1.9–3.7)
GLUCOSE SERPL-MCNC: 88 MG/DL (ref 65–99)
HCT VFR BLD AUTO: 42.6 % (ref 35–45)
HDLC SERPL-MCNC: 59 MG/DL
HGB BLD-MCNC: 14 G/DL (ref 11.7–15.5)
LDLC SERPL CALC-MCNC: 99 MG/DL (CALC)
LYMPHOCYTES # BLD AUTO: 2927 CELLS/UL (ref 850–3900)
LYMPHOCYTES NFR BLD AUTO: 57.4 %
MCH RBC QN AUTO: 30.4 PG (ref 27–33)
MCHC RBC AUTO-ENTMCNC: 32.9 G/DL (ref 32–36)
MCV RBC AUTO: 92.6 FL (ref 80–100)
MONOCYTES # BLD AUTO: 332 CELLS/UL (ref 200–950)
MONOCYTES NFR BLD AUTO: 6.5 %
NEUTROPHILS # BLD AUTO: 1709 CELLS/UL (ref 1500–7800)
NEUTROPHILS NFR BLD AUTO: 33.5 %
NONHDLC SERPL-MCNC: 114 MG/DL (CALC)
PLATELET # BLD AUTO: 245 THOUSAND/UL (ref 140–400)
PMV BLD REES-ECKER: 11.4 FL (ref 7.5–12.5)
POTASSIUM SERPL-SCNC: 4 MMOL/L (ref 3.5–5.3)
PROT SERPL-MCNC: 6.7 G/DL (ref 6.1–8.1)
RBC # BLD AUTO: 4.6 MILLION/UL (ref 3.8–5.1)
SODIUM SERPL-SCNC: 139 MMOL/L (ref 135–146)
TRIGL SERPL-MCNC: 65 MG/DL
TSH SERPL-ACNC: 2.26 MIU/L
WBC # BLD AUTO: 5.1 THOUSAND/UL (ref 3.8–10.8)

## 2024-11-18 ENCOUNTER — HOSPITAL ENCOUNTER (OUTPATIENT)
Dept: RADIOLOGY | Age: 39
Discharge: HOME | End: 2024-11-18
Payer: COMMERCIAL

## 2024-11-18 DIAGNOSIS — Z12.31 ENCOUNTER FOR SCREENING MAMMOGRAM FOR MALIGNANT NEOPLASM OF BREAST: ICD-10-CM

## 2024-11-18 PROCEDURE — 77063 BREAST TOMOSYNTHESIS BI: CPT

## 2024-11-18 PROCEDURE — 77067 SCR MAMMO BI INCL CAD: CPT

## 2024-11-20 ENCOUNTER — RESULTS FOLLOW-UP (OUTPATIENT)
Dept: OBGYN CLINIC | Facility: CLINIC | Age: 39
End: 2024-11-20

## 2025-01-28 ENCOUNTER — ANNUAL EXAM (OUTPATIENT)
Dept: OBGYN CLINIC | Facility: CLINIC | Age: 40
End: 2025-01-28
Payer: COMMERCIAL

## 2025-01-28 VITALS
WEIGHT: 171.6 LBS | HEIGHT: 65 IN | BODY MASS INDEX: 28.59 KG/M2 | DIASTOLIC BLOOD PRESSURE: 66 MMHG | SYSTOLIC BLOOD PRESSURE: 114 MMHG

## 2025-01-28 DIAGNOSIS — Z01.419 ENCOUNTER FOR GYNECOLOGICAL EXAMINATION WITHOUT ABNORMAL FINDING: Primary | ICD-10-CM

## 2025-01-28 DIAGNOSIS — Z91.89 AT HIGH RISK FOR BREAST CANCER: ICD-10-CM

## 2025-01-28 PROCEDURE — 99396 PREV VISIT EST AGE 40-64: CPT | Performed by: OBSTETRICS & GYNECOLOGY

## 2025-01-28 RX ORDER — LEVONORGESTREL 52 MG/1
INTRAUTERINE DEVICE INTRAUTERINE
COMMUNITY
End: 2025-01-28

## 2025-01-28 NOTE — PROGRESS NOTES
Carola Navas  1985      CC:  Yearly exam    S:  39 y.o. female here for yearly exam. Her cycles are regular, not heavy or crampy.  They have gotten lighter than they used to be.  The last few have been longer but that is a recent change.      Sexual activity: She is sexually active without pain, bleeding or dryness.     Contraception: She uses her partner's vasectomy for contraception.     Last Pap 1/18/2023 - ASCUS with negative HPV; repeat 3 years  Last Mammo 11/18/2024 - BIRAD-1; interested in breast MRI as adjuvent given elevated lifetime risk    We reviewed ASCCP guidelines for Pap testing today.       Current Outpatient Medications:     rimegepant sulfate (Nurtec) 75 mg TBDP, 1 tab daily as needed, Disp: 10 tablet, Rfl: 0  Social History     Socioeconomic History    Marital status: /Civil Union     Spouse name: Not on file    Number of children: Not on file    Years of education: Not on file    Highest education level: Not on file   Occupational History    Not on file   Tobacco Use    Smoking status: Never     Passive exposure: Never    Smokeless tobacco: Never   Vaping Use    Vaping status: Never Used   Substance and Sexual Activity    Alcohol use: Yes     Alcohol/week: 1.0 - 2.0 standard drink of alcohol     Types: 1 - 2 Glasses of wine per week     Comment: social    Drug use: No    Sexual activity: Yes     Partners: Male     Birth control/protection: Male Sterilization     Comment:    Other Topics Concern    Not on file   Social History Narrative    Caffeine use    Uses safety equipment, seat belts     Social Drivers of Health     Financial Resource Strain: Not on file   Food Insecurity: Not on file   Transportation Needs: Not on file   Physical Activity: Not on file   Stress: Not on file   Social Connections: Not on file   Intimate Partner Violence: Not on file   Housing Stability: Not on file     Family History   Problem Relation Age of Onset    Hyperlipidemia Mother      "Hypertension Mother     Lung cancer Father 54        smoker    Cancer Father     No Known Problems Sister     No Known Problems Daughter     Heart disease Maternal Grandmother     Hypertension Maternal Grandmother     Osteoporosis Maternal Grandmother     Heart disease Maternal Grandfather     Heart attack Paternal Grandfather     Heart disease Paternal Grandfather     No Known Problems Brother     Other Maternal Aunt         breast disorders    Alcohol abuse Paternal Uncle     Pancreatic cancer Paternal Uncle 50    Breast cancer Family         age unknown    No Known Problems Son       Past Medical History:   Diagnosis Date    Allergic Childhood    Seasonal only    Asthma     seasonal resolved    Breast cyst 2015    Breast disorder     cyst benign    Headache(784.0) 20s    Migraines    Intramural leiomyoma of uterus     Migraine     Seasonal allergies     Varicella     childhood        Review of Systems   Respiratory: Negative.    Cardiovascular: Negative.    Gastrointestinal: Negative for constipation and diarrhea.   Genitourinary: Negative for difficulty urinating, pelvic pain, vaginal bleeding, vaginal discharge, itching or odor.    O:  Blood pressure 114/66, height 5' 4.5\" (1.638 m), weight 77.8 kg (171 lb 9.6 oz), last menstrual period 01/24/2025, not currently breastfeeding.    Patient appears well and is not in distress  Neck is supple without masses  Breasts are symmetrical without mass, tenderness, nipple discharge, skin changes or adenopathy.   Abdomen is soft and nontender without masses.   External genitals are normal without lesions or rashes.  Urethral meatus and urethra are normal  Bladder is normal to palpation  Vagina is normal without discharge or bleeding.   Cervix is normal without discharge or lesion.   Uterus is normal, mobile, nontender without palpable mass.  Adnexa are normal, nontender, without palpable mass.     A:   Yearly exam.     P:   Pap due 2026   Mammo scheduled; MRI ordered    RTO " one year for yearly exam or sooner as needed.

## 2025-06-01 ENCOUNTER — HOSPITAL ENCOUNTER (OUTPATIENT)
Dept: RADIOLOGY | Facility: HOSPITAL | Age: 40
Discharge: HOME | End: 2025-06-01
Attending: OBSTETRICS & GYNECOLOGY
Payer: COMMERCIAL

## 2025-06-01 DIAGNOSIS — Z91.89 AT HIGH RISK FOR BREAST CANCER: ICD-10-CM

## 2025-06-01 PROCEDURE — A9585 GADOBUTROL INJECTION: HCPCS | Performed by: OBSTETRICS & GYNECOLOGY

## 2025-06-01 PROCEDURE — C8908 MRI W/O FOL W/CONT, BREAST,: HCPCS

## 2025-06-01 RX ORDER — GADOBUTROL 604.72 MG/ML
7 INJECTION INTRAVENOUS
Status: COMPLETED | OUTPATIENT
Start: 2025-06-01 | End: 2025-06-01

## 2025-06-01 RX ADMIN — GADOBUTROL 7 ML: 604.72 INJECTION INTRAVENOUS at 13:17

## 2025-08-15 ENCOUNTER — OFFICE VISIT (OUTPATIENT)
Dept: FAMILY MEDICINE CLINIC | Facility: CLINIC | Age: 40
End: 2025-08-15
Payer: COMMERCIAL

## 2025-08-18 ENCOUNTER — TELEPHONE (OUTPATIENT)
Age: 40
End: 2025-08-18